# Patient Record
Sex: FEMALE | Race: WHITE | HISPANIC OR LATINO | URBAN - METROPOLITAN AREA
[De-identification: names, ages, dates, MRNs, and addresses within clinical notes are randomized per-mention and may not be internally consistent; named-entity substitution may affect disease eponyms.]

---

## 2023-08-06 ENCOUNTER — INPATIENT (INPATIENT)
Facility: HOSPITAL | Age: 63
LOS: 4 days | Discharge: HOME | DRG: 446 | End: 2023-08-11
Attending: HOSPITALIST | Admitting: HOSPITALIST
Payer: MEDICAID

## 2023-08-06 VITALS
HEART RATE: 74 BPM | RESPIRATION RATE: 20 BRPM | TEMPERATURE: 98 F | OXYGEN SATURATION: 98 % | SYSTOLIC BLOOD PRESSURE: 141 MMHG | DIASTOLIC BLOOD PRESSURE: 79 MMHG

## 2023-08-06 DIAGNOSIS — R10.9 UNSPECIFIED ABDOMINAL PAIN: ICD-10-CM

## 2023-08-06 LAB
ALBUMIN SERPL ELPH-MCNC: 3.5 G/DL — SIGNIFICANT CHANGE UP (ref 3.3–5.2)
ALP SERPL-CCNC: 89 U/L — SIGNIFICANT CHANGE UP (ref 40–120)
ALT FLD-CCNC: 27 U/L — SIGNIFICANT CHANGE UP
ANION GAP SERPL CALC-SCNC: 12 MMOL/L — SIGNIFICANT CHANGE UP (ref 5–17)
APTT BLD: 25.4 SEC — SIGNIFICANT CHANGE UP (ref 24.5–35.6)
AST SERPL-CCNC: 25 U/L — SIGNIFICANT CHANGE UP
BASOPHILS # BLD AUTO: 0.02 K/UL — SIGNIFICANT CHANGE UP (ref 0–0.2)
BASOPHILS NFR BLD AUTO: 0.2 % — SIGNIFICANT CHANGE UP (ref 0–2)
BILIRUB SERPL-MCNC: 0.6 MG/DL — SIGNIFICANT CHANGE UP (ref 0.4–2)
BLD GP AB SCN SERPL QL: SIGNIFICANT CHANGE UP
BUN SERPL-MCNC: 11.9 MG/DL — SIGNIFICANT CHANGE UP (ref 8–20)
CALCIUM SERPL-MCNC: 8.3 MG/DL — LOW (ref 8.4–10.5)
CHLORIDE SERPL-SCNC: 104 MMOL/L — SIGNIFICANT CHANGE UP (ref 96–108)
CO2 SERPL-SCNC: 20 MMOL/L — LOW (ref 22–29)
CREAT SERPL-MCNC: 0.56 MG/DL — SIGNIFICANT CHANGE UP (ref 0.5–1.3)
EGFR: 104 ML/MIN/1.73M2 — SIGNIFICANT CHANGE UP
EOSINOPHIL # BLD AUTO: 0.06 K/UL — SIGNIFICANT CHANGE UP (ref 0–0.5)
EOSINOPHIL NFR BLD AUTO: 0.7 % — SIGNIFICANT CHANGE UP (ref 0–6)
GLUCOSE BLDC GLUCOMTR-MCNC: 268 MG/DL — HIGH (ref 70–99)
GLUCOSE SERPL-MCNC: 314 MG/DL — HIGH (ref 70–99)
HCT VFR BLD CALC: 33.7 % — LOW (ref 34.5–45)
HGB BLD-MCNC: 11.3 G/DL — LOW (ref 11.5–15.5)
IMM GRANULOCYTES NFR BLD AUTO: 0.4 % — SIGNIFICANT CHANGE UP (ref 0–0.9)
INR BLD: 1.11 RATIO — SIGNIFICANT CHANGE UP (ref 0.85–1.18)
LYMPHOCYTES # BLD AUTO: 1.47 K/UL — SIGNIFICANT CHANGE UP (ref 1–3.3)
LYMPHOCYTES # BLD AUTO: 17.3 % — SIGNIFICANT CHANGE UP (ref 13–44)
MCHC RBC-ENTMCNC: 28.3 PG — SIGNIFICANT CHANGE UP (ref 27–34)
MCHC RBC-ENTMCNC: 33.5 GM/DL — SIGNIFICANT CHANGE UP (ref 32–36)
MCV RBC AUTO: 84.5 FL — SIGNIFICANT CHANGE UP (ref 80–100)
MONOCYTES # BLD AUTO: 0.54 K/UL — SIGNIFICANT CHANGE UP (ref 0–0.9)
MONOCYTES NFR BLD AUTO: 6.4 % — SIGNIFICANT CHANGE UP (ref 2–14)
NEUTROPHILS # BLD AUTO: 6.36 K/UL — SIGNIFICANT CHANGE UP (ref 1.8–7.4)
NEUTROPHILS NFR BLD AUTO: 75 % — SIGNIFICANT CHANGE UP (ref 43–77)
PLATELET # BLD AUTO: 225 K/UL — SIGNIFICANT CHANGE UP (ref 150–400)
POTASSIUM SERPL-MCNC: 4.4 MMOL/L — SIGNIFICANT CHANGE UP (ref 3.5–5.3)
POTASSIUM SERPL-SCNC: 4.4 MMOL/L — SIGNIFICANT CHANGE UP (ref 3.5–5.3)
PROT SERPL-MCNC: 7 G/DL — SIGNIFICANT CHANGE UP (ref 6.6–8.7)
PROTHROM AB SERPL-ACNC: 12.3 SEC — SIGNIFICANT CHANGE UP (ref 9.5–13)
RBC # BLD: 3.99 M/UL — SIGNIFICANT CHANGE UP (ref 3.8–5.2)
RBC # FLD: 12.9 % — SIGNIFICANT CHANGE UP (ref 10.3–14.5)
SODIUM SERPL-SCNC: 136 MMOL/L — SIGNIFICANT CHANGE UP (ref 135–145)
WBC # BLD: 8.48 K/UL — SIGNIFICANT CHANGE UP (ref 3.8–10.5)
WBC # FLD AUTO: 8.48 K/UL — SIGNIFICANT CHANGE UP (ref 3.8–10.5)

## 2023-08-06 PROCEDURE — 71045 X-RAY EXAM CHEST 1 VIEW: CPT | Mod: 26

## 2023-08-06 PROCEDURE — 99222 1ST HOSP IP/OBS MODERATE 55: CPT

## 2023-08-06 PROCEDURE — 99285 EMERGENCY DEPT VISIT HI MDM: CPT

## 2023-08-06 RX ORDER — LANOLIN ALCOHOL/MO/W.PET/CERES
3 CREAM (GRAM) TOPICAL AT BEDTIME
Refills: 0 | Status: DISCONTINUED | OUTPATIENT
Start: 2023-08-06 | End: 2023-08-11

## 2023-08-06 RX ORDER — INSULIN GLARGINE 100 [IU]/ML
5 INJECTION, SOLUTION SUBCUTANEOUS AT BEDTIME
Refills: 0 | Status: DISCONTINUED | OUTPATIENT
Start: 2023-08-06 | End: 2023-08-11

## 2023-08-06 RX ORDER — DEXTROSE 50 % IN WATER 50 %
12.5 SYRINGE (ML) INTRAVENOUS ONCE
Refills: 0 | Status: DISCONTINUED | OUTPATIENT
Start: 2023-08-06 | End: 2023-08-11

## 2023-08-06 RX ORDER — DEXTROSE 50 % IN WATER 50 %
25 SYRINGE (ML) INTRAVENOUS ONCE
Refills: 0 | Status: DISCONTINUED | OUTPATIENT
Start: 2023-08-06 | End: 2023-08-11

## 2023-08-06 RX ORDER — ONDANSETRON 8 MG/1
4 TABLET, FILM COATED ORAL EVERY 8 HOURS
Refills: 0 | Status: DISCONTINUED | OUTPATIENT
Start: 2023-08-06 | End: 2023-08-11

## 2023-08-06 RX ORDER — MORPHINE SULFATE 50 MG/1
2 CAPSULE, EXTENDED RELEASE ORAL EVERY 4 HOURS
Refills: 0 | Status: DISCONTINUED | OUTPATIENT
Start: 2023-08-06 | End: 2023-08-11

## 2023-08-06 RX ORDER — SODIUM CHLORIDE 9 MG/ML
1000 INJECTION INTRAMUSCULAR; INTRAVENOUS; SUBCUTANEOUS
Refills: 0 | Status: DISCONTINUED | OUTPATIENT
Start: 2023-08-06 | End: 2023-08-08

## 2023-08-06 RX ORDER — GLUCAGON INJECTION, SOLUTION 0.5 MG/.1ML
1 INJECTION, SOLUTION SUBCUTANEOUS ONCE
Refills: 0 | Status: DISCONTINUED | OUTPATIENT
Start: 2023-08-06 | End: 2023-08-11

## 2023-08-06 RX ORDER — SODIUM CHLORIDE 9 MG/ML
1000 INJECTION, SOLUTION INTRAVENOUS
Refills: 0 | Status: DISCONTINUED | OUTPATIENT
Start: 2023-08-06 | End: 2023-08-11

## 2023-08-06 RX ORDER — INSULIN LISPRO 100/ML
VIAL (ML) SUBCUTANEOUS EVERY 6 HOURS
Refills: 0 | Status: DISCONTINUED | OUTPATIENT
Start: 2023-08-06 | End: 2023-08-11

## 2023-08-06 RX ORDER — DEXTROSE 50 % IN WATER 50 %
15 SYRINGE (ML) INTRAVENOUS ONCE
Refills: 0 | Status: DISCONTINUED | OUTPATIENT
Start: 2023-08-06 | End: 2023-08-11

## 2023-08-06 RX ORDER — ACETAMINOPHEN 500 MG
650 TABLET ORAL EVERY 6 HOURS
Refills: 0 | Status: DISCONTINUED | OUTPATIENT
Start: 2023-08-06 | End: 2023-08-11

## 2023-08-06 RX ORDER — SODIUM CHLORIDE 9 MG/ML
3 INJECTION INTRAMUSCULAR; INTRAVENOUS; SUBCUTANEOUS EVERY 8 HOURS
Refills: 0 | Status: DISCONTINUED | OUTPATIENT
Start: 2023-08-06 | End: 2023-08-11

## 2023-08-06 RX ADMIN — SODIUM CHLORIDE 3 MILLILITER(S): 9 INJECTION INTRAMUSCULAR; INTRAVENOUS; SUBCUTANEOUS at 22:52

## 2023-08-06 RX ADMIN — SODIUM CHLORIDE 100 MILLILITER(S): 9 INJECTION INTRAMUSCULAR; INTRAVENOUS; SUBCUTANEOUS at 23:24

## 2023-08-06 RX ADMIN — INSULIN GLARGINE 5 UNIT(S): 100 INJECTION, SOLUTION SUBCUTANEOUS at 23:23

## 2023-08-06 NOTE — ED ADULT NURSE NOTE - NSFALLUNIVINTERV_ED_ALL_ED
Bed/Stretcher in lowest position, wheels locked, appropriate side rails in place/Call bell, personal items and telephone in reach/Instruct patient to call for assistance before getting out of bed/chair/stretcher/Non-slip footwear applied when patient is off stretcher/[place] to call system/Physically safe environment - no spills, clutter or unnecessary equipment/Purposeful proactive rounding/Room/bathroom lighting operational, light cord in reach Bed/Stretcher in lowest position, wheels locked, appropriate side rails in place/Call bell, personal items and telephone in reach/Instruct patient to call for assistance before getting out of bed/chair/stretcher/Non-slip footwear applied when patient is off stretcher/[place] to call system/Physically safe environment - no spills, clutter or unnecessary equipment/Purposeful proactive rounding/Room/bathroom lighting operational, light cord in reach Bed/Stretcher in lowest position, wheels locked, appropriate side rails in place/Call bell, personal items and telephone in reach/Instruct patient to call for assistance before getting out of bed/chair/stretcher/Non-slip footwear applied when patient is off stretcher/[place] to call system/Physically safe environment - no spills, clutter or unnecessary equipment/Purposeful proactive rounding/Room/bathroom lighting operational, light cord in reach

## 2023-08-06 NOTE — ED PROVIDER NOTE - ATTENDING CONTRIBUTION TO CARE
64 yo F presents to ED transferred from [facility] [place] ERCP after having MRCP at [facility].  Pt c/o mild abd pain at present with no reported fever or vomiting.  On exam afebrile, non-toxic angelia, well hydrated, non-toxic appearing, PERRL, throat clear mm moist, Neck supple, Cor Reg, Lungs clear b/l, Abd soft, + mild RUQ tenderness to palp, no R/R/G,  Ext FROM, Neuro non-focal.  Will ken to [facility] for ERCP tomorrow by GI 64 yo F presents to ED transferred from [facility] [place] [place] from ERCP after having MRCP at [facility] [place] [place].  Pt c/o mild abd pain at present with no reported fever or vomiting.  On exam afebrile, non-toxic angelia, well hydrated, non-toxic appearing, PERRL, throat clear mm moist, Neck supple, Cor Reg, Lungs clear b/l, Abd soft, + mild RUQ tenderness to palp, no R/R/G,  Ext FROM, Neuro non-focal.  Will ken to [facility] for ERCP tomorrow by GI 62 yo F presents to ED transferred from [facility] from ERCP after having MRCP at [facility].  Pt c/o mild abd pain at present with no reported fever or vomiting.  On exam afebrile, non-toxic angelia, well hydrated, non-toxic appearing, PERRL, throat clear mm moist, Neck supple, Cor Reg, Lungs clear b/l, Abd soft, + mild RUQ tenderness to palp, no R/R/G,  Ext FROM, Neuro non-focal.  Will ken to [facility] for ERCP tomorrow by GI

## 2023-08-06 NOTE — ED ADULT NURSE NOTE - CHIEF COMPLAINT QUOTE
patient BIBA from [facility] [place] for [place]. states this AM woke up with abdominal pain, n/v/d. no fevers. patient BIBA from [facility] sent for ERCP. states this AM woke up with abdominal pain, n/v/d. no fevers. patient B[place] [facility] [place]. states this AM woke up with abdominal pain, n/v/d. no fevers.

## 2023-08-06 NOTE — ED ADULT NURSE NOTE - OBJECTIVE STATEMENT
pt presents from [facility] for ercp, patient received zosyn and fluid pta. patient resting comfortably, denies c/p/sob pt presents from [facility] for ercp, patient received zosyn and fluid pta. patient resting comfortably, denies c/p/sob pt presents from [facility] for ercp, patient received zosyn and fluid pta. patient resting comfortably, denies c/p/sob

## 2023-08-06 NOTE — ED PROVIDER NOTE - OBJECTIVE STATEMENT
64 y/o F pt sent from [facility] for ERCP. Has abd pain and NVD. No fever.     Presented to [facility] and had MRCP that showed: 1. Localized left hepatic lobe intraductal dilation. Difficult to localize T1 hyperintense nodule at the site of biliary obstruction, possibly representing an intraductal stone/proteinaceous material (such as in the setting of biliary stricture, recurrent pyogenic cholangitis, or Caroli's disease) versus portal venous thrombus or adjacent parenchymal lesion. 2. Nonspecific extrahepatic ductal dilation without specific cause identified. 64 y/o F pt sent from [facility] [place] [facility] [place] for [place]CP. Has abd pain and NVD. No fever.     Presented to [facility] [place] [facility] [place] and had MRCP that showed: 1. Localized left hepatic lobe intraductal dilation. Difficult to localize T1 hyperintense nodule at the site of biliary obstruction, possibly representing an intraductal stone/proteinaceous material (such as in the setting of biliary stricture, recurrent pyogenic cholangitis, or Caroli's disease) versus portal venous thrombus or adjacent parenchymal lesion. 2. Nonspecific extrahepatic ductal dilation without specific cause identified. 62 y/o F pt sent from [facility] for ERCP. Has abd pain and NVD. No fever.     Presented to [facility] and had MRCP that showed: 1. Localized left hepatic lobe intraductal dilation. Difficult to localize T1 hyperintense nodule at the site of biliary obstruction, possibly representing an intraductal stone/proteinaceous material (such as in the setting of biliary stricture, recurrent pyogenic cholangitis, or Caroli's disease) versus portal venous thrombus or adjacent parenchymal lesion. 2. Nonspecific extrahepatic ductal dilation without specific cause identified.

## 2023-08-06 NOTE — ED PROVIDER NOTE - CLINICAL SUMMARY MEDICAL DECISION MAKING FREE TEXT BOX
Pt sent from [facility] [place]. Preop labs and admission to medicine. NPO after midnight. Supportive care and close monitoring. [place] [facility] [place] Preop labs and admission to medicine. NPO after midnight. Supportive care and close monitoring. Pt sent from [facility] [place]. Preop labs and admission to medicine. NPO after midnight. Supportive care and close monitoring.

## 2023-08-06 NOTE — ED ADULT TRIAGE NOTE - CHIEF COMPLAINT QUOTE
patient BIBA from [facility] [place] for [place]CP. states this AM woke up with abdominal pain, n/v/d. no fevers. patient BIBA from [facility] sent for ERCP. states this AM woke up with abdominal pain, n/v/d. no fevers. patient BIBA from [facility] sent for ERCP. states this AM woke up with abdominal pain, n/v/d. no fevers.

## 2023-08-06 NOTE — ED PROVIDER NOTE - PHYSICAL EXAMINATION
Gen: Well appearing in NAD  Head: NC/AT  Neck: trachea midline  Card: regular rate and rhythm  Resp:  CTAB  Abd: soft, +epigastric ttp  Ext: no deformities above reported baseline  Neuro:  A&O, no motor or sensory deficits above reported baseline  Skin:  Warm and dry as visualized  Psych:  Normal affect and mood

## 2023-08-07 DIAGNOSIS — R93.89 ABNORMAL FINDINGS ON DIAGNOSTIC IMAGING OF OTHER SPECIFIED BODY STRUCTURES: ICD-10-CM

## 2023-08-07 LAB
A1C WITH ESTIMATED AVERAGE GLUCOSE RESULT: 10.2 % — HIGH (ref 4–5.6)
ALBUMIN SERPL ELPH-MCNC: 3.6 G/DL — SIGNIFICANT CHANGE UP (ref 3.3–5.2)
ALP SERPL-CCNC: 80 U/L — SIGNIFICANT CHANGE UP (ref 40–120)
ALT FLD-CCNC: 26 U/L — SIGNIFICANT CHANGE UP
ANION GAP SERPL CALC-SCNC: 13 MMOL/L — SIGNIFICANT CHANGE UP (ref 5–17)
AST SERPL-CCNC: 18 U/L — SIGNIFICANT CHANGE UP
BILIRUB SERPL-MCNC: 0.5 MG/DL — SIGNIFICANT CHANGE UP (ref 0.4–2)
BUN SERPL-MCNC: 8.9 MG/DL — SIGNIFICANT CHANGE UP (ref 8–20)
CALCIUM SERPL-MCNC: 8.5 MG/DL — SIGNIFICANT CHANGE UP (ref 8.4–10.5)
CHLORIDE SERPL-SCNC: 108 MMOL/L — SIGNIFICANT CHANGE UP (ref 96–108)
CO2 SERPL-SCNC: 19 MMOL/L — LOW (ref 22–29)
CREAT SERPL-MCNC: 0.51 MG/DL — SIGNIFICANT CHANGE UP (ref 0.5–1.3)
EGFR: 106 ML/MIN/1.73M2 — SIGNIFICANT CHANGE UP
ESTIMATED AVERAGE GLUCOSE: 246 MG/DL — HIGH (ref 68–114)
GLUCOSE BLDC GLUCOMTR-MCNC: 191 MG/DL — HIGH (ref 70–99)
GLUCOSE BLDC GLUCOMTR-MCNC: 214 MG/DL — HIGH (ref 70–99)
GLUCOSE BLDC GLUCOMTR-MCNC: 223 MG/DL — HIGH (ref 70–99)
GLUCOSE BLDC GLUCOMTR-MCNC: 238 MG/DL — HIGH (ref 70–99)
GLUCOSE BLDC GLUCOMTR-MCNC: 253 MG/DL — HIGH (ref 70–99)
GLUCOSE BLDC GLUCOMTR-MCNC: 269 MG/DL — HIGH (ref 70–99)
GLUCOSE SERPL-MCNC: 248 MG/DL — HIGH (ref 70–99)
HCT VFR BLD CALC: 36.9 % — SIGNIFICANT CHANGE UP (ref 34.5–45)
HGB BLD-MCNC: 12 G/DL — SIGNIFICANT CHANGE UP (ref 11.5–15.5)
MAGNESIUM SERPL-MCNC: 1.8 MG/DL — SIGNIFICANT CHANGE UP (ref 1.8–2.6)
MCHC RBC-ENTMCNC: 28 PG — SIGNIFICANT CHANGE UP (ref 27–34)
MCHC RBC-ENTMCNC: 32.5 GM/DL — SIGNIFICANT CHANGE UP (ref 32–36)
MCV RBC AUTO: 86 FL — SIGNIFICANT CHANGE UP (ref 80–100)
PHOSPHATE SERPL-MCNC: 2.9 MG/DL — SIGNIFICANT CHANGE UP (ref 2.4–4.7)
PLATELET # BLD AUTO: 255 K/UL — SIGNIFICANT CHANGE UP (ref 150–400)
POTASSIUM SERPL-MCNC: 3.9 MMOL/L — SIGNIFICANT CHANGE UP (ref 3.5–5.3)
POTASSIUM SERPL-SCNC: 3.9 MMOL/L — SIGNIFICANT CHANGE UP (ref 3.5–5.3)
PROT SERPL-MCNC: 6.9 G/DL — SIGNIFICANT CHANGE UP (ref 6.6–8.7)
RBC # BLD: 4.29 M/UL — SIGNIFICANT CHANGE UP (ref 3.8–5.2)
RBC # FLD: 13.2 % — SIGNIFICANT CHANGE UP (ref 10.3–14.5)
SODIUM SERPL-SCNC: 140 MMOL/L — SIGNIFICANT CHANGE UP (ref 135–145)
WBC # BLD: 7.38 K/UL — SIGNIFICANT CHANGE UP (ref 3.8–10.5)
WBC # FLD AUTO: 7.38 K/UL — SIGNIFICANT CHANGE UP (ref 3.8–10.5)

## 2023-08-07 PROCEDURE — 99233 SBSQ HOSP IP/OBS HIGH 50: CPT

## 2023-08-07 PROCEDURE — 99222 1ST HOSP IP/OBS MODERATE 55: CPT

## 2023-08-07 PROCEDURE — 93010 ELECTROCARDIOGRAM REPORT: CPT

## 2023-08-07 RX ORDER — FAMOTIDINE 10 MG/ML
20 INJECTION INTRAVENOUS DAILY
Refills: 0 | Status: DISCONTINUED | OUTPATIENT
Start: 2023-08-07 | End: 2023-08-11

## 2023-08-07 RX ADMIN — Medication 4: at 06:27

## 2023-08-07 RX ADMIN — Medication 4: at 12:45

## 2023-08-07 RX ADMIN — Medication 2: at 19:18

## 2023-08-07 RX ADMIN — SODIUM CHLORIDE 100 MILLILITER(S): 9 INJECTION INTRAMUSCULAR; INTRAVENOUS; SUBCUTANEOUS at 12:48

## 2023-08-07 RX ADMIN — FAMOTIDINE 100 MILLIGRAM(S): 10 INJECTION INTRAVENOUS at 12:26

## 2023-08-07 RX ADMIN — INSULIN GLARGINE 5 UNIT(S): 100 INJECTION, SOLUTION SUBCUTANEOUS at 21:41

## 2023-08-07 RX ADMIN — Medication 650 MILLIGRAM(S): at 23:57

## 2023-08-07 RX ADMIN — SODIUM CHLORIDE 100 MILLILITER(S): 9 INJECTION INTRAMUSCULAR; INTRAVENOUS; SUBCUTANEOUS at 21:41

## 2023-08-07 RX ADMIN — Medication 6: at 00:55

## 2023-08-07 RX ADMIN — Medication 4: at 23:57

## 2023-08-07 NOTE — CONSULT NOTE ADULT - ASSESSMENT
64 y/o female with hx of DM-2, HTN, HLD, GERD presented to ED after transferred from [facility] after radiology finding of localized left hepatic intraductal dilation on MRCP. RUQ sono with mild CBD dilation, measuring 8 mm, MRCP w/ localized left hepatic intraductal dilation concerning for neoplasm, stone, or stricture. Normal Liver enzymes.      64 y/o female with hx of DM-2, HTN, HLD, GERD presented to ED after transferred [place] [facility] [place] after radiology finding of localized left hepatic intraductal dilation on MRCP. RUQ sono with mild CBD dilation, measuring 8 mm, MRCP w/ localized left hepatic intraductal dilation concerning for neoplasm, stone, or stricture. Normal Liver enzymes.      64 y/o female with hx of DM-2, HTN, HLD, GERD presented to ED after transferred from [facility] [place] after radiology finding of localized left hepatic intraductal dilation on MRCP. RUQ sono with mild CBD dilation, measuring 8 mm, MRCP w/ localized left hepatic intraductal dilation concerning for neoplasm, stone, or stricture. Normal Liver enzymes.

## 2023-08-07 NOTE — CONSULT NOTE ADULT - NS ATTEND AMEND GEN_ALL_CORE FT
Patient seen and examined.  Patient was transferred from [facility].  She had some abdominal pain and also nausea vomiting and diarrhea.  All of them have abated.  There was mild elevation in the LFTs.  Ultrasound abdominal revealed mild dilation of the bile duct.  MRCP revealed intrahepatic ductal dilation.  Gallbladder was normal.  LFTs are completely normal.  White cell count is normal.  At this time, there is no particular indication to perform EUS ERCP.  However, we will order MRI with MRCP with IV contrast to further evaluate.  Surgical oncology evaluation should be considered.  Advance diet.  If the patient has insurance this procedure can also be performed as outpatient if patient tolerates diet and has no further symptoms. Patient seen and examined.  Patient was transferred from [facility].  She had some abdominal pain and also nausea vomiting and diarrhea.  All of them have abated.  There was mild elevation in the LFTs.  Ultrasound abdominal revealed mild dilation of the bile duct.  MRCP revealed intrahepatic ductal dilation.  Gallbladder was normal.  LFTs are completely normal.  White cell count is normal.  At this time, there is no particular indication to perform EUS ERCP.  However, we will order MRI with MRCP with IV contrast to further evaluate.  Surgical oncology evaluation should be considered.  Advance diet.  If the patient has insurance this procedure can also be performed as outpatient if patient tolerates diet and has no further symptoms. Patient seen and examined.  Patient was transferred from [facility].  She had some abdominal pain and also nausea vomiting and diarrhea.  All of them have abated.  There was mild elevation in the LFTs.  Ultrasound abdominal revealed mild dilation of the bile duct.  MRCP revealed intrahepatic ductal dilation.  Gallbladder was normal.  LFTs are completely normal.  White cell count is normal.  At this time, there is no particular indication to perform EUS ERCP.  However, we will order MRI with MRCP with IV contrast to further evaluate.  Surgical oncology evaluation should be considered.  Advance diet.  If the patient has insurance this procedure can also be performed as outpatient if patient tolerates diet and has no further symptoms.

## 2023-08-07 NOTE — CONSULT NOTE ADULT - PROBLEM SELECTOR RECOMMENDATION 9
RUQ sono with mild CBD dilation, measuring 8 mm, MRCP w/ localized left hepatic intraductal dilation concerning for neoplasm, stone, or stricture.   Her liver enzymes are normal, abdominal pain vomiting, diarrhea now resolved.   Can resume diet, consistent carb diet. Elevated blood glucose  400 on admission ([facility]), AIC 10.2. Symptoms may be due to gastroparesis.   Protonix for GI mucosal protection   Will discuss MRCP report with the radiologist. May need to repeat MRCP for further evaluation.   Trend LFTs, avoid hepatotoxic agents.

## 2023-08-07 NOTE — H&P ADULT - HISTORY OF PRESENT ILLNESS
62 y/o female with hx of DM-2, HTN, HLD, GERD who went to [facility] earlier today c/o RUQ pain, Nausea, followed by NBNB vomits. Describes symptoms as sudden onset with no recent illnesses, travel, or sick contacts. She denies any changes in her medications. At [facility] noted to have stable vitals, RUQ pain on exam, elevated AP with mild elevation of ALT, normal AST and normal bili. RUQ sono with mild CBD dilation, MRCP w/ localized left hepatic intraductal dilation concerning for neoplasm, stone, or stricture. Case was discussed with GI on call who agreed to have Patient transferred here for ERCP. At this time denies N/V, pain, SOB, F/C. Vitals stable. Seen with .  64 y/o female with hx of DM-2, HTN, HLD, GERD who went to [facility] earlier today c/o RUQ pain, Nausea, followed by NBNB vomits. Describes symptoms as sudden onset with no recent illnesses, travel, or sick contacts. She denies any changes in her medications. At [facility] noted to have stable vitals, RUQ pain on exam, elevated AP with mild elevation of ALT, normal AST and normal bili. RUQ sono with mild CBD dilation, MRCP w/ localized left hepatic intraductal dilation concerning for neoplasm, stone, or stricture. Case was discussed with GI on call who agreed to have Patient transferred here for ERCP. At this time denies N/V, pain, SOB, F/C. Vitals stable. Seen with .  64 y/o female with hx of DM-2, HTN, HLD, GERD who went to [facility] earlier today c/o RUQ pain, Nausea, followed by NBNB vomits. Describes symptoms as sudden onset with no recent illnesses, travel, or sick contacts. She denies any changes in her medications. At [facility] noted to have stable vitals, RUQ pain on exam, elevated AP with mild elevation of ALT, normal AST and normal bili. RUQ sono with mild CBD dilation, MRCP w/ localized left hepatic intraductal dilation concerning for neoplasm, stone, or stricture. Case was discussed with GI on call who agreed to have Patient transferred here for ERCP. At this time denies N/V, pain, SOB, F/C. Vitals stable. Seen with .

## 2023-08-07 NOTE — CHART NOTE - NSCHARTNOTEFT_GEN_A_CORE
chart H and P reviewed   pt is seen with Sulaiman olvera shailesh   GI consult noted   Pt is with some abd discomfort no nausea   NPO   vitals reviewed   further testing ERCP per GI final rec   will monitor and follow up

## 2023-08-07 NOTE — H&P ADULT - ASSESSMENT
64 y/o female with left hepatic lobe intraductal dilation, hx of DM-2, HTN, HLD, GERD    Left hepatic lobe intra-ductal dilation:  -Imaging/labs from [facility] reviewed in [facility]  -NPO after MN for possible ERCP in AM  -IVF  -Anti-emetics, analgesics  -Repeat CMP in AM  -Lipase normal at [facility]  -GI aware  -VC boots, OOB, ambulate    DM-2:  -Hold oral agents  -Q 6 hour accu checks  -SS coverage, basal insulin    HTN:  -Resume BP regimen post procedure     HLD:  -Resume Statin on DC    GERD:  -IV H2 while NPO    Case discussed with Patient using , Nursing  62 y/o female with left hepatic lobe intraductal dilation, hx of DM-2, HTN, HLD, GERD    Left hepatic lobe intra-ductal dilation:  -Imaging/labs from [facility] reviewed in [facility]  -NPO after MN for possible ERCP in AM  -IVF  -Anti-emetics, analgesics  -Repeat CMP in AM  -Lipase normal at [facility]  -GI aware  -VC boots, OOB, ambulate    DM-2:  -Hold oral agents  -Q 6 hour accu checks  -SS coverage, basal insulin    HTN:  -Resume BP regimen post procedure     HLD:  -Resume Statin on DC    GERD:  -IV H2 while NPO    Case discussed with Patient using , Nursing

## 2023-08-07 NOTE — PATIENT PROFILE ADULT - FALL HARM RISK - UNIVERSAL INTERVENTIONS
Bed in lowest position, wheels locked, appropriate side rails in place/Call bell, personal items and telephone in reach/Instruct patient to call for assistance before getting out of bed or chair/Non-slip footwear when patient is out of bed/[place] to call system/Physically safe environment - no spills, clutter or unnecessary equipment/Purposeful Proactive Rounding/Room/bathroom lighting operational, light cord in reach Bed in lowest position, wheels locked, appropriate side rails in place/Call bell, personal items and telephone in reach/Instruct patient to call for assistance before getting out of bed or chair/Non-slip footwear when patient is out of bed/[place] to call system/Physically safe environment - no spills, clutter or unnecessary equipment/Purposeful Proactive Rounding/Room/bathroom lighting operational, light cord in reach Bed in lowest position, wheels locked, appropriate side rails in place/Call bell, personal items and telephone in reach/Instruct patient to call for assistance before getting out of bed or chair/Non-slip footwear when patient is out of bed/[place] to call system/Physically safe environment - no spills, clutter or unnecessary equipment/Purposeful Proactive Rounding/Room/bathroom lighting operational, light cord in reach

## 2023-08-07 NOTE — CONSULT NOTE ADULT - SUBJECTIVE AND OBJECTIVE BOX
HISTORY OF PRESENT ILLNESS: 62 y/o female with hx of DM-2, HTN, HLD, GERD presented to ED after transferred from [facility] [place] after radiology finding of localized left hepatic intraductal dilation on MRCP. Patient was originally presented to [facility] [place] with c/o RUQ pain, Nausea,  NBNB emesis, and diarrhea x1 say. Reported as sudden onset and denies sick contact. Reports similar symptoms 1 year ago and for which she was seen at the hospital. No prior surgeries. No new medications. Patient is uncertain about the weight loss. She use of alcohol, smoking or illicit drug use. She denies family history of cancer. On arrival to [facility] [place] ED her labs shows elevated blood glucose 399, alk ph 140, ALT 41, normal AST, normal TB. Mild leucocytosis 15 K which is now normalized. Patient is afebrile.  RUQ sono with mild CBD dilation, MRCP w/ localized left hepatic intraductal dilation concerning for neoplasm, stone, or stricture. Her LFTs are now normalized. Patient reports some epigastric and RUQ pain. No tenderness. Nausea, vomiting and diarrhea now resolved. Patient requesting to eat breakfast.    ID [id number] was used.       Review of Systems:  . Constitutional: No fever, chills  . HEENT: Negative  · Respiratory and Thorax: No shortness of breath, no cough  · Cardiovascular: No chest pain, palpitation, no dizziness  · Gastrointestinal: see above  · Genitourinary: No hematuria  · Musculoskeletal: Negative  · Neurological: negative  · Psychiatric: no agitation, no anxiety      PAST MEDICAL/SURGICAL HISTORY:  HTN (hypertension)    HLD (hyperlipidemia)    Diabetes    GERD (gastroesophageal reflux disease)    No significant past surgical history      SOCIAL HISTORY:  - TOBACCO: Denies  - ALCOHOL: Denies  - ILLICIT DRUG USE: Denies    FAMILY HISTORY:  No known history of gastrointestinal or liver disease;  No pertinent family history in first degree relatives        HOME MEDICATIONS:  glimepiride 4 mg oral tablet: 1 orally once a day (07 Aug 2023 01:25)  Janumet 50 mg-500 mg oral tablet: 1 orally once a day (07 Aug 2023 01:25)  Lipitor 20 mg oral tablet: 1 orally once a day (07 Aug 2023 01:25)  lisinopril 20 mg oral tablet: 1 orally once a day (07 Aug 2023 01:25)  Protonix 40 mg oral delayed release tablet: 1 orally once a day (07 Aug 2023 01:25)    INPATIENT MEDICATIONS:  MEDICATIONS  (STANDING):  dextrose 5%. 1000 milliLiter(s) (50 mL/Hr) IV Continuous <Continuous>  dextrose 5%. 1000 milliLiter(s) (100 mL/Hr) IV Continuous <Continuous>  dextrose 50% Injectable 25 Gram(s) IV Push once  dextrose 50% Injectable 12.5 Gram(s) IV Push once  dextrose 50% Injectable 25 Gram(s) IV Push once  famotidine  IVPB 20 milliGRAM(s) IV Intermittent daily  glucagon  Injectable 1 milliGRAM(s) IntraMuscular once  insulin glargine Injectable (LANTUS) 5 Unit(s) SubCutaneous at bedtime  insulin lispro (ADMELOG) corrective regimen sliding scale   SubCutaneous every 6 hours  sodium chloride 0.9% lock flush 3 milliLiter(s) IV Push every 8 hours  sodium chloride 0.9%. 1000 milliLiter(s) (100 mL/Hr) IV Continuous <Continuous>    MEDICATIONS  (PRN):  acetaminophen     Tablet .. 650 milliGRAM(s) Oral every 6 hours PRN Temp greater or equal to 38C (100.4F), Mild Pain (1 - 3)  aluminum hydroxide/magnesium hydroxide/simethicone Suspension 30 milliLiter(s) Oral every 4 hours PRN Dyspepsia  dextrose Oral Gel 15 Gram(s) Oral once PRN Blood Glucose LESS THAN 70 milliGRAM(s)/deciliter  melatonin 3 milliGRAM(s) Oral at bedtime PRN Insomnia  morphine  - Injectable 2 milliGRAM(s) IV Push every 4 hours PRN Moderate Pain (4 - 6)  ondansetron Injectable 4 milliGRAM(s) IV Push every 8 hours PRN Nausea and/or Vomiting    ALLERGIES:  No Known Allergies    T(C): 36.9 (08-07-23 @ 04:41), Max: 36.9 (08-07-23 @ 04:41)  HR: 75 (08-07-23 @ 04:41) (64 - 78)  BP: 123/75 (08-07-23 @ 04:41) (123/75 - 141/79)  RR: 18 (08-07-23 @ 04:41) (18 - 20)  SpO2: 99% (08-07-23 @ 04:41) (98% - 99%)      PHYSICAL EXAM:    Constitutional: No acute distress  Neuro: Awake alert, oriented  HEENT: anicteric sclerae  Neck: supple, no JVD  CV: regular rate, regular rhythm  Pulm/chest: lung sounds CTA and equal bilaterally, no accessory muscle use noted  Abd: soft, nontender, nondistended +bowel sounds. No rigidity, rebound tenderness, or guarding  Ext: no Cyanosis, clubbing or edema  Skin: warm, no jaundice   Psych: calm, cooperative      LABS:             12.0   7.38  )-----------( 255      ( 08-07 @ 06:34 )             36.9                11.3   8.48  )-----------( 225      ( 08-06 @ 20:56 )             33.7       PT/INR - ( 06 Aug 2023 20:56 )   PT: 12.3 sec;   INR: 1.11 ratio         PTT - ( 06 Aug 2023 20:56 )  PTT:25.4 sec  08-07    140  |  108  |  8.9  ----------------------------<  248<H>  3.9   |  19.0<L>  |  0.51    Ca    8.5      07 Aug 2023 06:34  Phos  2.9     08-07  Mg     1.8     08-07    TPro  6.9  /  Alb  3.6  /  TBili  0.5  /  DBili  x   /  AST  18  /  ALT  26  /  AlkPhos  80  08-07    LIVER FUNCTIONS - ( 07 Aug 2023 06:34 )  Alb: 3.6 g/dL / Pro: 6.9 g/dL / ALK PHOS: 80 U/L / ALT: 26 U/L / AST: 18 U/L / GGT: x                 Urinalysis Basic - ( 07 Aug 2023 06:34 )    Color: x / Appearance: x / SG: x / pH: x  Gluc: 248 mg/dL / Ketone: x  / Bili: x / Urobili: x   Blood: x / Protein: x / Nitrite: x   Leuk Esterase: x / RBC: x / WBC x   Sq Epi: x / Non Sq Epi: x / Bacteria: x            INTERPRETATION:  CLINICAL INFORMATION: Dilated CBD    COMPARISON: Abdominal ultrasound dated 08/06/2023    CONTRAST/COMPLICATIONS:  IV Contrast: NONE  Oral Contrast: NONE  Complications: None reported at time of study completion    PROCEDURE:  MRI/MRCP was performed. Radial and 3D MRCP sequences were obtained.      FINDINGS:  Limited study without intravenous contrast. Many of the sequences are also partially limited due to respiratory motion artifact.    LOWER CHEST: Within normal limits.    LIVER: Steatosis. Hepatomegaly with relative enlargement of the right hepatic lobe/Riedel's lobe.  BILE DUCTS: Localized mild to moderate intrahepatic ductal dilatation at the junctions of segments 2 and 4. Difficult to localize 14 mm T1 hyperintense nodule at the site of biliary obstruction, possibly an intraductal stone or proteinaceous material within a dilated ectatic duct versus parenchymal or vascular in location. Mild extrahepatic dilatation without definite cause identified, upper common bile duct measuring 8 mm. Tapered appearance of the distal duct above the ampulla.  GALLBLADDER: Within normal limits.  SPLEEN: Within normal limits.  PANCREAS: Within normal limits.  ADRENALS: Within normal limits.  KIDNEYS/URETERS: Within normal limits.    VISUALIZED PORTIONS:  BOWEL: Within normal limits.  PERITONEUM: No ascites.  VESSELS: Within normal limits.  RETROPERITONEUM/LYMPH NODES: No lymphadenopathy.  ABDOMINAL WALL: To fat-containing umbilical hernia.  BONES: Within normal limits.    IMPRESSION:  1.  Localized left hepatic lobe intraductal dilatation. Difficult to localize T1 hyperintense nodule at the site of biliary obstruction, possibly representing an intraductal stone/proteinaceous material (such as in the setting of biliary stricture, recurrent pyogenic cholangitis, or Caroli's disease) versus portal venous thrombus or adjacent parenchymal lesion.  2.  Nonspecific extrahepatic ductal dilatation without specific cause identified. Consider ampullary stenosis, occult ampullary lesion, or stricture.  3.  Hepatic steatosis and hepatomegaly.    Recommend repeat MRI/MRCP with and without contrast.    US abdomen (08/06/2023):     COMPARISON: None available.    TECHNIQUE: Sonography of the right upper quadrant.    FINDINGS:  Liver: Diffuse fatty infiltration.  Bile ducts: Mild intrahepatic biliary ductal dilatation. Common bile duct measures 8 mm.  Gallbladder: Within normal limits.  Pancreas: Visualized portions are within normal limits.  Right kidney: 10.4 cm. No hydronephrosis.  Ascites: None.  IVC: Visualized portions are within normal limits.    IMPRESSION:  Hepatic steatosis.    Mild intrahepatic biliary ductal dilatation and mild common bile duct dilatation without identifiable cause. Further evaluation with contrast-enhanced CT scan of the abdomen recommended. HISTORY OF PRESENT ILLNESS: 64 y/o female with hx of DM-2, HTN, HLD, GERD presented to ED after transferred from [facility] [place] after radiology finding of localized left hepatic intraductal dilation on MRCP. Patient was originally presented to [facility] with c/o RUQ pain, Nausea,  NBNB emesis, and diarrhea x1 say. Reported as sudden onset and denies sick contact. Reports similar symptoms 1 year ago and for which she was seen at the hospital. No prior surgeries. No new medications. Patient is uncertain about the weight loss. She use of alcohol, smoking or illicit drug use. She denies family history of cancer. On arrival to [facility] ED her labs shows elevated blood glucose 399, alk ph 140, ALT 41, normal AST, normal TB. Mild leucocytosis 15 K which is now normalized. Patient is afebrile.  RUQ sono with mild CBD dilation, MRCP w/ localized left hepatic intraductal dilation concerning for neoplasm, stone, or stricture. Her LFTs are now normalized. Patient reports some epigastric and RUQ pain. No tenderness. Nausea, vomiting and diarrhea now resolved. Patient requesting to eat breakfast.    ID [id number] was used.       Review of Systems:  . Constitutional: No fever, chills  . HEENT: Negative  · Respiratory and Thorax: No shortness of breath, no cough  · Cardiovascular: No chest pain, palpitation, no dizziness  · Gastrointestinal: see above  · Genitourinary: No hematuria  · Musculoskeletal: Negative  · Neurological: negative  · Psychiatric: no agitation, no anxiety      PAST MEDICAL/SURGICAL HISTORY:  HTN (hypertension)    HLD (hyperlipidemia)    Diabetes    GERD (gastroesophageal reflux disease)    No significant past surgical history      SOCIAL HISTORY:  - TOBACCO: Denies  - ALCOHOL: Denies  - ILLICIT DRUG USE: Denies    FAMILY HISTORY:  No known history of gastrointestinal or liver disease;  No pertinent family history in first degree relatives        HOME MEDICATIONS:  glimepiride 4 mg oral tablet: 1 orally once a day (07 Aug 2023 01:25)  Janumet 50 mg-500 mg oral tablet: 1 orally once a day (07 Aug 2023 01:25)  Lipitor 20 mg oral tablet: 1 orally once a day (07 Aug 2023 01:25)  lisinopril 20 mg oral tablet: 1 orally once a day (07 Aug 2023 01:25)  Protonix 40 mg oral delayed release tablet: 1 orally once a day (07 Aug 2023 01:25)    INPATIENT MEDICATIONS:  MEDICATIONS  (STANDING):  dextrose 5%. 1000 milliLiter(s) (50 mL/Hr) IV Continuous <Continuous>  dextrose 5%. 1000 milliLiter(s) (100 mL/Hr) IV Continuous <Continuous>  dextrose 50% Injectable 25 Gram(s) IV Push once  dextrose 50% Injectable 12.5 Gram(s) IV Push once  dextrose 50% Injectable 25 Gram(s) IV Push once  famotidine  IVPB 20 milliGRAM(s) IV Intermittent daily  glucagon  Injectable 1 milliGRAM(s) IntraMuscular once  insulin glargine Injectable (LANTUS) 5 Unit(s) SubCutaneous at bedtime  insulin lispro (ADMELOG) corrective regimen sliding scale   SubCutaneous every 6 hours  sodium chloride 0.9% lock flush 3 milliLiter(s) IV Push every 8 hours  sodium chloride 0.9%. 1000 milliLiter(s) (100 mL/Hr) IV Continuous <Continuous>    MEDICATIONS  (PRN):  acetaminophen     Tablet .. 650 milliGRAM(s) Oral every 6 hours PRN Temp greater or equal to 38C (100.4F), Mild Pain (1 - 3)  aluminum hydroxide/magnesium hydroxide/simethicone Suspension 30 milliLiter(s) Oral every 4 hours PRN Dyspepsia  dextrose Oral Gel 15 Gram(s) Oral once PRN Blood Glucose LESS THAN 70 milliGRAM(s)/deciliter  melatonin 3 milliGRAM(s) Oral at bedtime PRN Insomnia  morphine  - Injectable 2 milliGRAM(s) IV Push every 4 hours PRN Moderate Pain (4 - 6)  ondansetron Injectable 4 milliGRAM(s) IV Push every 8 hours PRN Nausea and/or Vomiting    ALLERGIES:  No Known Allergies    T(C): 36.9 (08-07-23 @ 04:41), Max: 36.9 (08-07-23 @ 04:41)  HR: 75 (08-07-23 @ 04:41) (64 - 78)  BP: 123/75 (08-07-23 @ 04:41) (123/75 - 141/79)  RR: 18 (08-07-23 @ 04:41) (18 - 20)  SpO2: 99% (08-07-23 @ 04:41) (98% - 99%)      PHYSICAL EXAM:    Constitutional: No acute distress  Neuro: Awake alert, oriented  HEENT: anicteric sclerae  Neck: supple, no JVD  CV: regular rate, regular rhythm  Pulm/chest: lung sounds CTA and equal bilaterally, no accessory muscle use noted  Abd: soft, nontender, nondistended +bowel sounds. No rigidity, rebound tenderness, or guarding  Ext: no Cyanosis, clubbing or edema  Skin: warm, no jaundice   Psych: calm, cooperative      LABS:             12.0   7.38  )-----------( 255      ( 08-07 @ 06:34 )             36.9                11.3   8.48  )-----------( 225      ( 08-06 @ 20:56 )             33.7       PT/INR - ( 06 Aug 2023 20:56 )   PT: 12.3 sec;   INR: 1.11 ratio         PTT - ( 06 Aug 2023 20:56 )  PTT:25.4 sec  08-07    140  |  108  |  8.9  ----------------------------<  248<H>  3.9   |  19.0<L>  |  0.51    Ca    8.5      07 Aug 2023 06:34  Phos  2.9     08-07  Mg     1.8     08-07    TPro  6.9  /  Alb  3.6  /  TBili  0.5  /  DBili  x   /  AST  18  /  ALT  26  /  AlkPhos  80  08-07    LIVER FUNCTIONS - ( 07 Aug 2023 06:34 )  Alb: 3.6 g/dL / Pro: 6.9 g/dL / ALK PHOS: 80 U/L / ALT: 26 U/L / AST: 18 U/L / GGT: x                 Urinalysis Basic - ( 07 Aug 2023 06:34 )    Color: x / Appearance: x / SG: x / pH: x  Gluc: 248 mg/dL / Ketone: x  / Bili: x / Urobili: x   Blood: x / Protein: x / Nitrite: x   Leuk Esterase: x / RBC: x / WBC x   Sq Epi: x / Non Sq Epi: x / Bacteria: x            INTERPRETATION:  CLINICAL INFORMATION: Dilated CBD    COMPARISON: Abdominal ultrasound dated 08/06/2023    CONTRAST/COMPLICATIONS:  IV Contrast: NONE  Oral Contrast: NONE  Complications: None reported at time of study completion    PROCEDURE:  MRI/MRCP was performed. Radial and 3D MRCP sequences were obtained.      FINDINGS:  Limited study without intravenous contrast. Many of the sequences are also partially limited due to respiratory motion artifact.    LOWER CHEST: Within normal limits.    LIVER: Steatosis. Hepatomegaly with relative enlargement of the right hepatic lobe/Riedel's lobe.  BILE DUCTS: Localized mild to moderate intrahepatic ductal dilatation at the junctions of segments 2 and 4. Difficult to localize 14 mm T1 hyperintense nodule at the site of biliary obstruction, possibly an intraductal stone or proteinaceous material within a dilated ectatic duct versus parenchymal or vascular in location. Mild extrahepatic dilatation without definite cause identified, upper common bile duct measuring 8 mm. Tapered appearance of the distal duct above the ampulla.  GALLBLADDER: Within normal limits.  SPLEEN: Within normal limits.  PANCREAS: Within normal limits.  ADRENALS: Within normal limits.  KIDNEYS/URETERS: Within normal limits.    VISUALIZED PORTIONS:  BOWEL: Within normal limits.  PERITONEUM: No ascites.  VESSELS: Within normal limits.  RETROPERITONEUM/LYMPH NODES: No lymphadenopathy.  ABDOMINAL WALL: To fat-containing umbilical hernia.  BONES: Within normal limits.    IMPRESSION:  1.  Localized left hepatic lobe intraductal dilatation. Difficult to localize T1 hyperintense nodule at the site of biliary obstruction, possibly representing an intraductal stone/proteinaceous material (such as in the setting of biliary stricture, recurrent pyogenic cholangitis, or Caroli's disease) versus portal venous thrombus or adjacent parenchymal lesion.  2.  Nonspecific extrahepatic ductal dilatation without specific cause identified. Consider ampullary stenosis, occult ampullary lesion, or stricture.  3.  Hepatic steatosis and hepatomegaly.    Recommend repeat MRI/MRCP with and without contrast.    US abdomen (08/06/2023):     COMPARISON: None available.    TECHNIQUE: Sonography of the right upper quadrant.    FINDINGS:  Liver: Diffuse fatty infiltration.  Bile ducts: Mild intrahepatic biliary ductal dilatation. Common bile duct measures 8 mm.  Gallbladder: Within normal limits.  Pancreas: Visualized portions are within normal limits.  Right kidney: 10.4 cm. No hydronephrosis.  Ascites: None.  IVC: Visualized portions are within normal limits.    IMPRESSION:  Hepatic steatosis.    Mild intrahepatic biliary ductal dilatation and mild common bile duct dilatation without identifiable cause. Further evaluation with contrast-enhanced CT scan of the abdomen recommended. HISTORY OF PRESENT ILLNESS: 64 y/o female with hx of DM-2, HTN, HLD, GERD presented to ED after transferred from [facility] [place] after radiology finding of localized left hepatic intraductal dilation on MRCP. Patient was originally presented to [facility] with c/o RUQ pain, Nausea,  NBNB emesis, and diarrhea x1 say. Reported as sudden onset and denies sick contact. Reports similar symptoms 1 year ago and for which she was seen at the hospital. No prior surgeries. No new medications. Patient is uncertain about the weight loss. She use of alcohol, smoking or illicit drug use. She denies family history of cancer. On arrival to [facility] ED her labs shows elevated blood glucose 399, alk ph 140, ALT 41, normal AST, normal TB. Mild leucocytosis 15 K which is now normalized. Patient is afebrile.  RUQ sono with mild CBD dilation, MRCP w/ localized left hepatic intraductal dilation concerning for neoplasm, stone, or stricture. Her LFTs are now normalized. Patient reports some epigastric and RUQ pain. No tenderness. Nausea, vomiting and diarrhea now resolved. Patient requesting to eat breakfast.    ID [id number] was used.       Review of Systems:  . Constitutional: No fever, chills  . HEENT: Negative  · Respiratory and Thorax: No shortness of breath, no cough  · Cardiovascular: No chest pain, palpitation, no dizziness  · Gastrointestinal: see above  · Genitourinary: No hematuria  · Musculoskeletal: Negative  · Neurological: negative  · Psychiatric: no agitation, no anxiety      PAST MEDICAL/SURGICAL HISTORY:  HTN (hypertension)    HLD (hyperlipidemia)    Diabetes    GERD (gastroesophageal reflux disease)    No significant past surgical history      SOCIAL HISTORY:  - TOBACCO: Denies  - ALCOHOL: Denies  - ILLICIT DRUG USE: Denies    FAMILY HISTORY:  No known history of gastrointestinal or liver disease;  No pertinent family history in first degree relatives        HOME MEDICATIONS:  glimepiride 4 mg oral tablet: 1 orally once a day (07 Aug 2023 01:25)  Janumet 50 mg-500 mg oral tablet: 1 orally once a day (07 Aug 2023 01:25)  Lipitor 20 mg oral tablet: 1 orally once a day (07 Aug 2023 01:25)  lisinopril 20 mg oral tablet: 1 orally once a day (07 Aug 2023 01:25)  Protonix 40 mg oral delayed release tablet: 1 orally once a day (07 Aug 2023 01:25)    INPATIENT MEDICATIONS:  MEDICATIONS  (STANDING):  dextrose 5%. 1000 milliLiter(s) (50 mL/Hr) IV Continuous <Continuous>  dextrose 5%. 1000 milliLiter(s) (100 mL/Hr) IV Continuous <Continuous>  dextrose 50% Injectable 25 Gram(s) IV Push once  dextrose 50% Injectable 12.5 Gram(s) IV Push once  dextrose 50% Injectable 25 Gram(s) IV Push once  famotidine  IVPB 20 milliGRAM(s) IV Intermittent daily  glucagon  Injectable 1 milliGRAM(s) IntraMuscular once  insulin glargine Injectable (LANTUS) 5 Unit(s) SubCutaneous at bedtime  insulin lispro (ADMELOG) corrective regimen sliding scale   SubCutaneous every 6 hours  sodium chloride 0.9% lock flush 3 milliLiter(s) IV Push every 8 hours  sodium chloride 0.9%. 1000 milliLiter(s) (100 mL/Hr) IV Continuous <Continuous>    MEDICATIONS  (PRN):  acetaminophen     Tablet .. 650 milliGRAM(s) Oral every 6 hours PRN Temp greater or equal to 38C (100.4F), Mild Pain (1 - 3)  aluminum hydroxide/magnesium hydroxide/simethicone Suspension 30 milliLiter(s) Oral every 4 hours PRN Dyspepsia  dextrose Oral Gel 15 Gram(s) Oral once PRN Blood Glucose LESS THAN 70 milliGRAM(s)/deciliter  melatonin 3 milliGRAM(s) Oral at bedtime PRN Insomnia  morphine  - Injectable 2 milliGRAM(s) IV Push every 4 hours PRN Moderate Pain (4 - 6)  ondansetron Injectable 4 milliGRAM(s) IV Push every 8 hours PRN Nausea and/or Vomiting    ALLERGIES:  No Known Allergies    T(C): 36.9 (08-07-23 @ 04:41), Max: 36.9 (08-07-23 @ 04:41)  HR: 75 (08-07-23 @ 04:41) (64 - 78)  BP: 123/75 (08-07-23 @ 04:41) (123/75 - 141/79)  RR: 18 (08-07-23 @ 04:41) (18 - 20)  SpO2: 99% (08-07-23 @ 04:41) (98% - 99%)      PHYSICAL EXAM:    Constitutional: No acute distress  Neuro: Awake alert, oriented  HEENT: anicteric sclerae  Neck: supple, no JVD  CV: regular rate, regular rhythm  Pulm/chest: lung sounds CTA and equal bilaterally, no accessory muscle use noted  Abd: soft, nontender, nondistended +bowel sounds. No rigidity, rebound tenderness, or guarding  Ext: no Cyanosis, clubbing or edema  Skin: warm, no jaundice   Psych: calm, cooperative      LABS:             12.0   7.38  )-----------( 255      ( 08-07 @ 06:34 )             36.9                11.3   8.48  )-----------( 225      ( 08-06 @ 20:56 )             33.7       PT/INR - ( 06 Aug 2023 20:56 )   PT: 12.3 sec;   INR: 1.11 ratio         PTT - ( 06 Aug 2023 20:56 )  PTT:25.4 sec  08-07    140  |  108  |  8.9  ----------------------------<  248<H>  3.9   |  19.0<L>  |  0.51    Ca    8.5      07 Aug 2023 06:34  Phos  2.9     08-07  Mg     1.8     08-07    TPro  6.9  /  Alb  3.6  /  TBili  0.5  /  DBili  x   /  AST  18  /  ALT  26  /  AlkPhos  80  08-07    LIVER FUNCTIONS - ( 07 Aug 2023 06:34 )  Alb: 3.6 g/dL / Pro: 6.9 g/dL / ALK PHOS: 80 U/L / ALT: 26 U/L / AST: 18 U/L / GGT: x                 Urinalysis Basic - ( 07 Aug 2023 06:34 )    Color: x / Appearance: x / SG: x / pH: x  Gluc: 248 mg/dL / Ketone: x  / Bili: x / Urobili: x   Blood: x / Protein: x / Nitrite: x   Leuk Esterase: x / RBC: x / WBC x   Sq Epi: x / Non Sq Epi: x / Bacteria: x            INTERPRETATION:  CLINICAL INFORMATION: Dilated CBD    COMPARISON: Abdominal ultrasound dated 08/06/2023    CONTRAST/COMPLICATIONS:  IV Contrast: NONE  Oral Contrast: NONE  Complications: None reported at time of study completion    PROCEDURE:  MRI/MRCP was performed. Radial and 3D MRCP sequences were obtained.      FINDINGS:  Limited study without intravenous contrast. Many of the sequences are also partially limited due to respiratory motion artifact.    LOWER CHEST: Within normal limits.    LIVER: Steatosis. Hepatomegaly with relative enlargement of the right hepatic lobe/Riedel's lobe.  BILE DUCTS: Localized mild to moderate intrahepatic ductal dilatation at the junctions of segments 2 and 4. Difficult to localize 14 mm T1 hyperintense nodule at the site of biliary obstruction, possibly an intraductal stone or proteinaceous material within a dilated ectatic duct versus parenchymal or vascular in location. Mild extrahepatic dilatation without definite cause identified, upper common bile duct measuring 8 mm. Tapered appearance of the distal duct above the ampulla.  GALLBLADDER: Within normal limits.  SPLEEN: Within normal limits.  PANCREAS: Within normal limits.  ADRENALS: Within normal limits.  KIDNEYS/URETERS: Within normal limits.    VISUALIZED PORTIONS:  BOWEL: Within normal limits.  PERITONEUM: No ascites.  VESSELS: Within normal limits.  RETROPERITONEUM/LYMPH NODES: No lymphadenopathy.  ABDOMINAL WALL: To fat-containing umbilical hernia.  BONES: Within normal limits.    IMPRESSION:  1.  Localized left hepatic lobe intraductal dilatation. Difficult to localize T1 hyperintense nodule at the site of biliary obstruction, possibly representing an intraductal stone/proteinaceous material (such as in the setting of biliary stricture, recurrent pyogenic cholangitis, or Caroli's disease) versus portal venous thrombus or adjacent parenchymal lesion.  2.  Nonspecific extrahepatic ductal dilatation without specific cause identified. Consider ampullary stenosis, occult ampullary lesion, or stricture.  3.  Hepatic steatosis and hepatomegaly.    Recommend repeat MRI/MRCP with and without contrast.    US abdomen (08/06/2023):     COMPARISON: None available.    TECHNIQUE: Sonography of the right upper quadrant.    FINDINGS:  Liver: Diffuse fatty infiltration.  Bile ducts: Mild intrahepatic biliary ductal dilatation. Common bile duct measures 8 mm.  Gallbladder: Within normal limits.  Pancreas: Visualized portions are within normal limits.  Right kidney: 10.4 cm. No hydronephrosis.  Ascites: None.  IVC: Visualized portions are within normal limits.    IMPRESSION:  Hepatic steatosis.    Mild intrahepatic biliary ductal dilatation and mild common bile duct dilatation without identifiable cause. Further evaluation with contrast-enhanced CT scan of the abdomen recommended. HISTORY OF PRESENT ILLNESS: 62 y/o female with hx of DM-2, HTN, HLD, GERD presented to ED [place] [facility] [place]ology finding of localized left hepatic intraductal dilation on MRCP. Patient was originally presented to [facility] [place] with c/o RUQ pain, Nausea,  NBNB emesis, and diarrhea x1 say. Reported as sudden onset and denies sick contact. Reports similar symptoms 1 year ago and for which she was seen at the hospital. No prior surgeries. No new medications. Patient is uncertain about the weight loss. She use of alcohol, smoking or illicit drug use. She denies family history of cancer. On arrival to [facility] [place] ED her labs shows elevated blood glucose 399, alk ph 140, ALT 41, normal AST, normal TB. Mild leucocytosis 15 K which is now normalized. Patient is afebrile.  RUQ sono with mild CBD dilation, measuring 8 mm, MRCP w/ localized left hepatic intraductal dilation concerning for neoplasm, stone, or stricture. Her LFTs are now normalized. Patient reports some epigastric and RUQ pain. No tenderness. Nausea, vomiting and diarrhea now resolved. Patient requesting to eat breakfast.    ID [id number] was used.       Review of Systems:  . Constitutional: No fever, chills  . HEENT: Negative  · Respiratory and Thorax: No shortness of breath, no cough  · Cardiovascular: No chest pain, palpitation, no dizziness  · Gastrointestinal: see above  · Genitourinary: No hematuria  · Musculoskeletal: Negative  · Neurological: negative  · Psychiatric: no agitation, no anxiety      PAST MEDICAL/SURGICAL HISTORY:  HTN (hypertension)    HLD (hyperlipidemia)    Diabetes    GERD (gastroesophageal reflux disease)    No significant past surgical history      SOCIAL HISTORY:  - TOBACCO: Denies  - ALCOHOL: Denies  - ILLICIT DRUG USE: Denies    FAMILY HISTORY:  No known history of gastrointestinal or liver disease;  No pertinent family history in first degree relatives        HOME MEDICATIONS:  glimepiride 4 mg oral tablet: 1 orally once a day (07 Aug 2023 01:25)  Janumet 50 mg-500 mg oral tablet: 1 orally once a day (07 Aug 2023 01:25)  Lipitor 20 mg oral tablet: 1 orally once a day (07 Aug 2023 01:25)  lisinopril 20 mg oral tablet: 1 orally once a day (07 Aug 2023 01:25)  Protonix 40 mg oral delayed release tablet: 1 orally once a day (07 Aug 2023 01:25)    INPATIENT MEDICATIONS:  MEDICATIONS  (STANDING):  dextrose 5%. 1000 milliLiter(s) (50 mL/Hr) IV Continuous <Continuous>  dextrose 5%. 1000 milliLiter(s) (100 mL/Hr) IV Continuous <Continuous>  dextrose 50% Injectable 25 Gram(s) IV Push once  dextrose 50% Injectable 12.5 Gram(s) IV Push once  dextrose 50% Injectable 25 Gram(s) IV Push once  famotidine  IVPB 20 milliGRAM(s) IV Intermittent daily  glucagon  Injectable 1 milliGRAM(s) IntraMuscular once  insulin glargine Injectable (LANTUS) 5 Unit(s) SubCutaneous at bedtime  insulin lispro (ADMELOG) corrective regimen sliding scale   SubCutaneous every 6 hours  sodium chloride 0.9% lock flush 3 milliLiter(s) IV Push every 8 hours  sodium chloride 0.9%. 1000 milliLiter(s) (100 mL/Hr) IV Continuous <Continuous>    MEDICATIONS  (PRN):  acetaminophen     Tablet .. 650 milliGRAM(s) Oral every 6 hours PRN Temp greater or equal to 38C (100.4F), Mild Pain (1 - 3)  aluminum hydroxide/magnesium hydroxide/simethicone Suspension 30 milliLiter(s) Oral every 4 hours PRN Dyspepsia  dextrose Oral Gel 15 Gram(s) Oral once PRN Blood Glucose LESS THAN 70 milliGRAM(s)/deciliter  melatonin 3 milliGRAM(s) Oral at bedtime PRN Insomnia  morphine  - Injectable 2 milliGRAM(s) IV Push every 4 hours PRN Moderate Pain (4 - 6)  ondansetron Injectable 4 milliGRAM(s) IV Push every 8 hours PRN Nausea and/or Vomiting    ALLERGIES:  No Known Allergies    T(C): 36.9 (08-07-23 @ 04:41), Max: 36.9 (08-07-23 @ 04:41)  HR: 75 (08-07-23 @ 04:41) (64 - 78)  BP: 123/75 (08-07-23 @ 04:41) (123/75 - 141/79)  RR: 18 (08-07-23 @ 04:41) (18 - 20)  SpO2: 99% (08-07-23 @ 04:41) (98% - 99%)      PHYSICAL EXAM:    Constitutional: No acute distress  Neuro: Awake alert, oriented  HEENT: anicteric sclerae  Neck: supple, no JVD  CV: regular rate, regular rhythm  Pulm/chest: lung sounds CTA and equal bilaterally, no accessory muscle use noted  Abd: soft, nontender, nondistended +bowel sounds. No rigidity, rebound tenderness, or guarding  Ext: no Cyanosis, clubbing or edema  Skin: warm, no jaundice   Psych: calm, cooperative      LABS:             12.0   7.38  )-----------( 255      ( 08-07 @ 06:34 )             36.9                11.3   8.48  )-----------( 225      ( 08-06 @ 20:56 )             33.7       PT/INR - ( 06 Aug 2023 20:56 )   PT: 12.3 sec;   INR: 1.11 ratio         PTT - ( 06 Aug 2023 20:56 )  PTT:25.4 sec  08-07    140  |  108  |  8.9  ----------------------------<  248<H>  3.9   |  19.0<L>  |  0.51    Ca    8.5      07 Aug 2023 06:34  Phos  2.9     08-07  Mg     1.8     08-07    TPro  6.9  /  Alb  3.6  /  TBili  0.5  /  DBili  x   /  AST  18  /  ALT  26  /  AlkPhos  80  08-07    LIVER FUNCTIONS - ( 07 Aug 2023 06:34 )  Alb: 3.6 g/dL / Pro: 6.9 g/dL / ALK PHOS: 80 U/L / ALT: 26 U/L / AST: 18 U/L / GGT: x                 Urinalysis Basic - ( 07 Aug 2023 06:34 )    Color: x / Appearance: x / SG: x / pH: x  Gluc: 248 mg/dL / Ketone: x  / Bili: x / Urobili: x   Blood: x / Protein: x / Nitrite: x   Leuk Esterase: x / RBC: x / WBC x   Sq Epi: x / Non Sq Epi: x / Bacteria: x            INTERPRETATION:  CLINICAL INFORMATION: Dilated CBD    COMPARISON: Abdominal ultrasound dated 08/06/2023    CONTRAST/COMPLICATIONS:  IV Contrast: NONE  Oral Contrast: NONE  Complications: None reported at time of study completion    PROCEDURE:  MRI/MRCP was performed. Radial and 3D MRCP sequences were obtained.      FINDINGS:  Limited study without intravenous contrast. Many of the sequences are also partially limited due to respiratory motion artifact.    LOWER CHEST: Within normal limits.    LIVER: Steatosis. Hepatomegaly with relative enlargement of the right hepatic lobe/Riedel's lobe.  BILE DUCTS: Localized mild to moderate intrahepatic ductal dilatation at the junctions of segments 2 and 4. Difficult to localize 14 mm T1 hyperintense nodule at the site of biliary obstruction, possibly an intraductal stone or proteinaceous material within a dilated ectatic duct versus parenchymal or vascular in location. Mild extrahepatic dilatation without definite cause identified, upper common bile duct measuring 8 mm. Tapered appearance of the distal duct above the ampulla.  GALLBLADDER: Within normal limits.  SPLEEN: Within normal limits.  PANCREAS: Within normal limits.  ADRENALS: Within normal limits.  KIDNEYS/URETERS: Within normal limits.    VISUALIZED PORTIONS:  BOWEL: Within normal limits.  PERITONEUM: No ascites.  VESSELS: Within normal limits.  RETROPERITONEUM/LYMPH NODES: No lymphadenopathy.  ABDOMINAL WALL: To fat-containing umbilical hernia.  BONES: Within normal limits.    IMPRESSION:  1.  Localized left hepatic lobe intraductal dilatation. Difficult to localize T1 hyperintense nodule at the site of biliary obstruction, possibly representing an intraductal stone/proteinaceous material (such as in the setting of biliary stricture, recurrent pyogenic cholangitis, or Caroli's disease) versus portal venous thrombus or adjacent parenchymal lesion.  2.  Nonspecific extrahepatic ductal dilatation without specific cause identified. Consider ampullary stenosis, occult ampullary lesion, or stricture.  3.  Hepatic steatosis and hepatomegaly.    Recommend repeat MRI/MRCP with and without contrast.    US abdomen (08/06/2023):     COMPARISON: None available.    TECHNIQUE: Sonography of the right upper quadrant.    FINDINGS:  Liver: Diffuse fatty infiltration.  Bile ducts: Mild intrahepatic biliary ductal dilatation. Common bile duct measures 8 mm.  Gallbladder: Within normal limits.  Pancreas: Visualized portions are within normal limits.  Right kidney: 10.4 cm. No hydronephrosis.  Ascites: None.  IVC: Visualized portions are within normal limits.    IMPRESSION:  Hepatic steatosis.    Mild intrahepatic biliary ductal dilatation and mild common bile duct dilatation without identifiable cause. Further evaluation with contrast-enhanced CT scan of the abdomen recommended. HISTORY OF PRESENT ILLNESS: 64 y/o female with hx of DM-2, HTN, HLD, GERD [place] [facility] [place] of localized left hepatic intraductal dilation on MRCP. Patient was [place] [facility] [place]/o RUQ pain, Nausea,  NBNB emesis, and diarrhea x1 say. Reported as sudden onset and denies sick contact. Reports similar symptoms 1 year ago and for which she was seen at the hospital. No prior surgeries. No new medications. Patient is uncertain about the weight loss. She use of alcohol, smoking or illicit drug use. She denies family history of cancer. On [place] [facility] [place] labs shows elevated blood glucose 399, alk ph 140, ALT 41, normal AST, normal TB. Mild leucocytosis 15 K which is now normalized. Patient is afebrile.  RUQ sono with mild CBD dilation, measuring 8 mm, MRCP w/ localized left hepatic intraductal dilation concerning for neoplasm, stone, or stricture. Her LFTs are now normalized. Patient reports some epigastric and RUQ pain. No tenderness. Nausea, vomiting and diarrhea now resolved. Patient requesting to eat breakfast.    ID [id number] was used.       Review of Systems:  . Constitutional: No fever, chills  . HEENT: Negative  · Respiratory and Thorax: No shortness of breath, no cough  · Cardiovascular: No chest pain, palpitation, no dizziness  · Gastrointestinal: see above  · Genitourinary: No hematuria  · Musculoskeletal: Negative  · Neurological: negative  · Psychiatric: no agitation, no anxiety      PAST MEDICAL/SURGICAL HISTORY:  HTN (hypertension)    HLD (hyperlipidemia)    Diabetes    GERD (gastroesophageal reflux disease)    No significant past surgical history      SOCIAL HISTORY:  - TOBACCO: Denies  - ALCOHOL: Denies  - ILLICIT DRUG USE: Denies    FAMILY HISTORY:  No known history of gastrointestinal or liver disease;  No pertinent family history in first degree relatives        HOME MEDICATIONS:  glimepiride 4 mg oral tablet: 1 orally once a day (07 Aug 2023 01:25)  Janumet 50 mg-500 mg oral tablet: 1 orally once a day (07 Aug 2023 01:25)  Lipitor 20 mg oral tablet: 1 orally once a day (07 Aug 2023 01:25)  lisinopril 20 mg oral tablet: 1 orally once a day (07 Aug 2023 01:25)  Protonix 40 mg oral delayed release tablet: 1 orally once a day (07 Aug 2023 01:25)    INPATIENT MEDICATIONS:  MEDICATIONS  (STANDING):  dextrose 5%. 1000 milliLiter(s) (50 mL/Hr) IV Continuous <Continuous>  dextrose 5%. 1000 milliLiter(s) (100 mL/Hr) IV Continuous <Continuous>  dextrose 50% Injectable 25 Gram(s) IV Push once  dextrose 50% Injectable 12.5 Gram(s) IV Push once  dextrose 50% Injectable 25 Gram(s) IV Push once  famotidine  IVPB 20 milliGRAM(s) IV Intermittent daily  glucagon  Injectable 1 milliGRAM(s) IntraMuscular once  insulin glargine Injectable (LANTUS) 5 Unit(s) SubCutaneous at bedtime  insulin lispro (ADMELOG) corrective regimen sliding scale   SubCutaneous every 6 hours  sodium chloride 0.9% lock flush 3 milliLiter(s) IV Push every 8 hours  sodium chloride 0.9%. 1000 milliLiter(s) (100 mL/Hr) IV Continuous <Continuous>    MEDICATIONS  (PRN):  acetaminophen     Tablet .. 650 milliGRAM(s) Oral every 6 hours PRN Temp greater or equal to 38C (100.4F), Mild Pain (1 - 3)  aluminum hydroxide/magnesium hydroxide/simethicone Suspension 30 milliLiter(s) Oral every 4 hours PRN Dyspepsia  dextrose Oral Gel 15 Gram(s) Oral once PRN Blood Glucose LESS THAN 70 milliGRAM(s)/deciliter  melatonin 3 milliGRAM(s) Oral at bedtime PRN Insomnia  morphine  - Injectable 2 milliGRAM(s) IV Push every 4 hours PRN Moderate Pain (4 - 6)  ondansetron Injectable 4 milliGRAM(s) IV Push every 8 hours PRN Nausea and/or Vomiting    ALLERGIES:  No Known Allergies    T(C): 36.9 (08-07-23 @ 04:41), Max: 36.9 (08-07-23 @ 04:41)  HR: 75 (08-07-23 @ 04:41) (64 - 78)  BP: 123/75 (08-07-23 @ 04:41) (123/75 - 141/79)  RR: 18 (08-07-23 @ 04:41) (18 - 20)  SpO2: 99% (08-07-23 @ 04:41) (98% - 99%)      PHYSICAL EXAM:    Constitutional: No acute distress  Neuro: Awake alert, oriented  HEENT: anicteric sclerae  Neck: supple, no JVD  CV: regular rate, regular rhythm  Pulm/chest: lung sounds CTA and equal bilaterally, no accessory muscle use noted  Abd: soft, nontender, nondistended +bowel sounds. No rigidity, rebound tenderness, or guarding  Ext: no Cyanosis, clubbing or edema  Skin: warm, no jaundice   Psych: calm, cooperative      LABS:             12.0   7.38  )-----------( 255      ( 08-07 @ 06:34 )             36.9                11.3   8.48  )-----------( 225      ( 08-06 @ 20:56 )             33.7       PT/INR - ( 06 Aug 2023 20:56 )   PT: 12.3 sec;   INR: 1.11 ratio         PTT - ( 06 Aug 2023 20:56 )  PTT:25.4 sec  08-07    140  |  108  |  8.9  ----------------------------<  248<H>  3.9   |  19.0<L>  |  0.51    Ca    8.5      07 Aug 2023 06:34  Phos  2.9     08-07  Mg     1.8     08-07    TPro  6.9  /  Alb  3.6  /  TBili  0.5  /  DBili  x   /  AST  18  /  ALT  26  /  AlkPhos  80  08-07    LIVER FUNCTIONS - ( 07 Aug 2023 06:34 )  Alb: 3.6 g/dL / Pro: 6.9 g/dL / ALK PHOS: 80 U/L / ALT: 26 U/L / AST: 18 U/L / GGT: x                 Urinalysis Basic - ( 07 Aug 2023 06:34 )    Color: x / Appearance: x / SG: x / pH: x  Gluc: 248 mg/dL / Ketone: x  / Bili: x / Urobili: x   Blood: x / Protein: x / Nitrite: x   Leuk Esterase: x / RBC: x / WBC x   Sq Epi: x / Non Sq Epi: x / Bacteria: x            INTERPRETATION:  CLINICAL INFORMATION: Dilated CBD    COMPARISON: Abdominal ultrasound dated 08/06/2023    CONTRAST/COMPLICATIONS:  IV Contrast: NONE  Oral Contrast: NONE  Complications: None reported at time of study completion    PROCEDURE:  MRI/MRCP was performed. Radial and 3D MRCP sequences were obtained.      FINDINGS:  Limited study without intravenous contrast. Many of the sequences are also partially limited due to respiratory motion artifact.    LOWER CHEST: Within normal limits.    LIVER: Steatosis. Hepatomegaly with relative enlargement of the right hepatic lobe/Riedel's lobe.  BILE DUCTS: Localized mild to moderate intrahepatic ductal dilatation at the junctions of segments 2 and 4. Difficult to localize 14 mm T1 hyperintense nodule at the site of biliary obstruction, possibly an intraductal stone or proteinaceous material within a dilated ectatic duct versus parenchymal or vascular in location. Mild extrahepatic dilatation without definite cause identified, upper common bile duct measuring 8 mm. Tapered appearance of the distal duct above the ampulla.  GALLBLADDER: Within normal limits.  SPLEEN: Within normal limits.  PANCREAS: Within normal limits.  ADRENALS: Within normal limits.  KIDNEYS/URETERS: Within normal limits.    VISUALIZED PORTIONS:  BOWEL: Within normal limits.  PERITONEUM: No ascites.  VESSELS: Within normal limits.  RETROPERITONEUM/LYMPH NODES: No lymphadenopathy.  ABDOMINAL WALL: To fat-containing umbilical hernia.  BONES: Within normal limits.    IMPRESSION:  1.  Localized left hepatic lobe intraductal dilatation. Difficult to localize T1 hyperintense nodule at the site of biliary obstruction, possibly representing an intraductal stone/proteinaceous material (such as in the setting of biliary stricture, recurrent pyogenic cholangitis, or Caroli's disease) versus portal venous thrombus or adjacent parenchymal lesion.  2.  Nonspecific extrahepatic ductal dilatation without specific cause identified. Consider ampullary stenosis, occult ampullary lesion, or stricture.  3.  Hepatic steatosis and hepatomegaly.    Recommend repeat MRI/MRCP with and without contrast.    US abdomen (08/06/2023):     COMPARISON: None available.    TECHNIQUE: Sonography of the right upper quadrant.    FINDINGS:  Liver: Diffuse fatty infiltration.  Bile ducts: Mild intrahepatic biliary ductal dilatation. Common bile duct measures 8 mm.  Gallbladder: Within normal limits.  Pancreas: Visualized portions are within normal limits.  Right kidney: 10.4 cm. No hydronephrosis.  Ascites: None.  IVC: Visualized portions are within normal limits.    IMPRESSION:  Hepatic steatosis.    Mild intrahepatic biliary ductal dilatation and mild common bile duct dilatation without identifiable cause. Further evaluation with contrast-enhanced CT scan of the abdomen recommended. HISTORY OF PRESENT ILLNESS: 64 y/o female with hx of DM-2, HTN, HLD, GERD [place] [facility] [place] of localized left hepatic intraductal dilation on MRCP. Patient was [place] [facility] [place]/o RUQ pain, Nausea,  NBNB emesis, and diarrhea x1 say. Reported as sudden onset and denies sick contact. Reports similar symptoms 1 year ago and for which she was seen at the hospital. No prior surgeries. No new medications. Patient is uncertain about the weight loss. She use of alcohol, smoking or illicit drug use. She denies family history of cancer. [place] [facility] [place] labs shows elevated blood glucose 399, alk ph 140, ALT 41, normal AST, normal TB. Mild leucocytosis 15 K which is now normalized. Patient is afebrile.  RUQ sono with mild CBD dilation, measuring 8 mm, MRCP w/ localized left hepatic intraductal dilation concerning for neoplasm, stone, or stricture. Her LFTs are now normalized. Patient reports some epigastric and RUQ pain. No tenderness. Nausea, vomiting and diarrhea now resolved. Patient requesting to eat breakfast.    ID [id number] was used.       Review of Systems:  . Constitutional: No fever, chills  . HEENT: Negative  · Respiratory and Thorax: No shortness of breath, no cough  · Cardiovascular: No chest pain, palpitation, no dizziness  · Gastrointestinal: see above  · Genitourinary: No hematuria  · Musculoskeletal: Negative  · Neurological: negative  · Psychiatric: no agitation, no anxiety      PAST MEDICAL/SURGICAL HISTORY:  HTN (hypertension)    HLD (hyperlipidemia)    Diabetes    GERD (gastroesophageal reflux disease)    No significant past surgical history      SOCIAL HISTORY:  - TOBACCO: Denies  - ALCOHOL: Denies  - ILLICIT DRUG USE: Denies    FAMILY HISTORY:  No known history of gastrointestinal or liver disease;  No pertinent family history in first degree relatives        HOME MEDICATIONS:  glimepiride 4 mg oral tablet: 1 orally once a day (07 Aug 2023 01:25)  Janumet 50 mg-500 mg oral tablet: 1 orally once a day (07 Aug 2023 01:25)  Lipitor 20 mg oral tablet: 1 orally once a day (07 Aug 2023 01:25)  lisinopril 20 mg oral tablet: 1 orally once a day (07 Aug 2023 01:25)  Protonix 40 mg oral delayed release tablet: 1 orally once a day (07 Aug 2023 01:25)    INPATIENT MEDICATIONS:  MEDICATIONS  (STANDING):  dextrose 5%. 1000 milliLiter(s) (50 mL/Hr) IV Continuous <Continuous>  dextrose 5%. 1000 milliLiter(s) (100 mL/Hr) IV Continuous <Continuous>  dextrose 50% Injectable 25 Gram(s) IV Push once  dextrose 50% Injectable 12.5 Gram(s) IV Push once  dextrose 50% Injectable 25 Gram(s) IV Push once  famotidine  IVPB 20 milliGRAM(s) IV Intermittent daily  glucagon  Injectable 1 milliGRAM(s) IntraMuscular once  insulin glargine Injectable (LANTUS) 5 Unit(s) SubCutaneous at bedtime  insulin lispro (ADMELOG) corrective regimen sliding scale   SubCutaneous every 6 hours  sodium chloride 0.9% lock flush 3 milliLiter(s) IV Push every 8 hours  sodium chloride 0.9%. 1000 milliLiter(s) (100 mL/Hr) IV Continuous <Continuous>    MEDICATIONS  (PRN):  acetaminophen     Tablet .. 650 milliGRAM(s) Oral every 6 hours PRN Temp greater or equal to 38C (100.4F), Mild Pain (1 - 3)  aluminum hydroxide/magnesium hydroxide/simethicone Suspension 30 milliLiter(s) Oral every 4 hours PRN Dyspepsia  dextrose Oral Gel 15 Gram(s) Oral once PRN Blood Glucose LESS THAN 70 milliGRAM(s)/deciliter  melatonin 3 milliGRAM(s) Oral at bedtime PRN Insomnia  morphine  - Injectable 2 milliGRAM(s) IV Push every 4 hours PRN Moderate Pain (4 - 6)  ondansetron Injectable 4 milliGRAM(s) IV Push every 8 hours PRN Nausea and/or Vomiting    ALLERGIES:  No Known Allergies    T(C): 36.9 (08-07-23 @ 04:41), Max: 36.9 (08-07-23 @ 04:41)  HR: 75 (08-07-23 @ 04:41) (64 - 78)  BP: 123/75 (08-07-23 @ 04:41) (123/75 - 141/79)  RR: 18 (08-07-23 @ 04:41) (18 - 20)  SpO2: 99% (08-07-23 @ 04:41) (98% - 99%)      PHYSICAL EXAM:    Constitutional: No acute distress  Neuro: Awake alert, oriented  HEENT: anicteric sclerae  Neck: supple, no JVD  CV: regular rate, regular rhythm  Pulm/chest: lung sounds CTA and equal bilaterally, no accessory muscle use noted  Abd: soft, nontender, nondistended +bowel sounds. No rigidity, rebound tenderness, or guarding  Ext: no Cyanosis, clubbing or edema  Skin: warm, no jaundice   Psych: calm, cooperative      LABS:             12.0   7.38  )-----------( 255      ( 08-07 @ 06:34 )             36.9                11.3   8.48  )-----------( 225      ( 08-06 @ 20:56 )             33.7       PT/INR - ( 06 Aug 2023 20:56 )   PT: 12.3 sec;   INR: 1.11 ratio         PTT - ( 06 Aug 2023 20:56 )  PTT:25.4 sec  08-07    140  |  108  |  8.9  ----------------------------<  248<H>  3.9   |  19.0<L>  |  0.51    Ca    8.5      07 Aug 2023 06:34  Phos  2.9     08-07  Mg     1.8     08-07    TPro  6.9  /  Alb  3.6  /  TBili  0.5  /  DBili  x   /  AST  18  /  ALT  26  /  AlkPhos  80  08-07    LIVER FUNCTIONS - ( 07 Aug 2023 06:34 )  Alb: 3.6 g/dL / Pro: 6.9 g/dL / ALK PHOS: 80 U/L / ALT: 26 U/L / AST: 18 U/L / GGT: x                 Urinalysis Basic - ( 07 Aug 2023 06:34 )    Color: x / Appearance: x / SG: x / pH: x  Gluc: 248 mg/dL / Ketone: x  / Bili: x / Urobili: x   Blood: x / Protein: x / Nitrite: x   Leuk Esterase: x / RBC: x / WBC x   Sq Epi: x / Non Sq Epi: x / Bacteria: x            INTERPRETATION:  CLINICAL INFORMATION: Dilated CBD    COMPARISON: Abdominal ultrasound dated 08/06/2023    CONTRAST/COMPLICATIONS:  IV Contrast: NONE  Oral Contrast: NONE  Complications: None reported at time of study completion    PROCEDURE:  MRI/MRCP was performed. Radial and 3D MRCP sequences were obtained.      FINDINGS:  Limited study without intravenous contrast. Many of the sequences are also partially limited due to respiratory motion artifact.    LOWER CHEST: Within normal limits.    LIVER: Steatosis. Hepatomegaly with relative enlargement of the right hepatic lobe/Riedel's lobe.  BILE DUCTS: Localized mild to moderate intrahepatic ductal dilatation at the junctions of segments 2 and 4. Difficult to localize 14 mm T1 hyperintense nodule at the site of biliary obstruction, possibly an intraductal stone or proteinaceous material within a dilated ectatic duct versus parenchymal or vascular in location. Mild extrahepatic dilatation without definite cause identified, upper common bile duct measuring 8 mm. Tapered appearance of the distal duct above the ampulla.  GALLBLADDER: Within normal limits.  SPLEEN: Within normal limits.  PANCREAS: Within normal limits.  ADRENALS: Within normal limits.  KIDNEYS/URETERS: Within normal limits.    VISUALIZED PORTIONS:  BOWEL: Within normal limits.  PERITONEUM: No ascites.  VESSELS: Within normal limits.  RETROPERITONEUM/LYMPH NODES: No lymphadenopathy.  ABDOMINAL WALL: To fat-containing umbilical hernia.  BONES: Within normal limits.    IMPRESSION:  1.  Localized left hepatic lobe intraductal dilatation. Difficult to localize T1 hyperintense nodule at the site of biliary obstruction, possibly representing an intraductal stone/proteinaceous material (such as in the setting of biliary stricture, recurrent pyogenic cholangitis, or Caroli's disease) versus portal venous thrombus or adjacent parenchymal lesion.  2.  Nonspecific extrahepatic ductal dilatation without specific cause identified. Consider ampullary stenosis, occult ampullary lesion, or stricture.  3.  Hepatic steatosis and hepatomegaly.    Recommend repeat MRI/MRCP with and without contrast.    US abdomen (08/06/2023):     COMPARISON: None available.    TECHNIQUE: Sonography of the right upper quadrant.    FINDINGS:  Liver: Diffuse fatty infiltration.  Bile ducts: Mild intrahepatic biliary ductal dilatation. Common bile duct measures 8 mm.  Gallbladder: Within normal limits.  Pancreas: Visualized portions are within normal limits.  Right kidney: 10.4 cm. No hydronephrosis.  Ascites: None.  IVC: Visualized portions are within normal limits.    IMPRESSION:  Hepatic steatosis.    Mild intrahepatic biliary ductal dilatation and mild common bile duct dilatation without identifiable cause. Further evaluation with contrast-enhanced CT scan of the abdomen recommended.

## 2023-08-07 NOTE — H&P ADULT - NSICDXPASTMEDICALHX_GEN_ALL_CORE_FT
PAST MEDICAL HISTORY:  Diabetes     GERD (gastroesophageal reflux disease)     HLD (hyperlipidemia)     HTN (hypertension)

## 2023-08-08 LAB
GLUCOSE BLDC GLUCOMTR-MCNC: 186 MG/DL — HIGH (ref 70–99)
GLUCOSE BLDC GLUCOMTR-MCNC: 188 MG/DL — HIGH (ref 70–99)
GLUCOSE BLDC GLUCOMTR-MCNC: 190 MG/DL — HIGH (ref 70–99)
GLUCOSE BLDC GLUCOMTR-MCNC: 202 MG/DL — HIGH (ref 70–99)
GLUCOSE BLDC GLUCOMTR-MCNC: 245 MG/DL — HIGH (ref 70–99)
HCV AB S/CO SERPL IA: 0.09 S/CO — SIGNIFICANT CHANGE UP (ref 0–0.99)
HCV AB SERPL-IMP: SIGNIFICANT CHANGE UP

## 2023-08-08 PROCEDURE — 99232 SBSQ HOSP IP/OBS MODERATE 35: CPT

## 2023-08-08 RX ORDER — LISINOPRIL 2.5 MG/1
5 TABLET ORAL DAILY
Refills: 0 | Status: DISCONTINUED | OUTPATIENT
Start: 2023-08-09 | End: 2023-08-09

## 2023-08-08 RX ADMIN — Medication 2: at 23:45

## 2023-08-08 RX ADMIN — INSULIN GLARGINE 5 UNIT(S): 100 INJECTION, SOLUTION SUBCUTANEOUS at 23:43

## 2023-08-08 RX ADMIN — Medication 2: at 12:47

## 2023-08-08 RX ADMIN — SODIUM CHLORIDE 3 MILLILITER(S): 9 INJECTION INTRAMUSCULAR; INTRAVENOUS; SUBCUTANEOUS at 13:57

## 2023-08-08 RX ADMIN — FAMOTIDINE 100 MILLIGRAM(S): 10 INJECTION INTRAVENOUS at 12:47

## 2023-08-08 RX ADMIN — Medication 650 MILLIGRAM(S): at 23:40

## 2023-08-08 RX ADMIN — Medication 2: at 06:07

## 2023-08-08 RX ADMIN — Medication 4: at 18:40

## 2023-08-08 RX ADMIN — SODIUM CHLORIDE 3 MILLILITER(S): 9 INJECTION INTRAMUSCULAR; INTRAVENOUS; SUBCUTANEOUS at 23:32

## 2023-08-08 RX ADMIN — Medication 650 MILLIGRAM(S): at 14:41

## 2023-08-08 RX ADMIN — Medication 650 MILLIGRAM(S): at 15:41

## 2023-08-08 NOTE — PROGRESS NOTE ADULT - SUBJECTIVE AND OBJECTIVE BOX
Patient is a 63y old  Female who presents with a chief complaint of Choledocholithiasis (08 Aug 2023 10:24)      Patient seen and examined at bedside with  Sulaiman   pt is reporting no abd pain ,no nausea   GI follow up appreciated     MR of abd w/wo contrast P     No overnight events reported.     ALLERGIES:  No Known Allergies    MEDICATIONS  (STANDING):  dextrose 5%. 1000 milliLiter(s) (50 mL/Hr) IV Continuous <Continuous>  dextrose 5%. 1000 milliLiter(s) (100 mL/Hr) IV Continuous <Continuous>  dextrose 50% Injectable 25 Gram(s) IV Push once  dextrose 50% Injectable 12.5 Gram(s) IV Push once  dextrose 50% Injectable 25 Gram(s) IV Push once  famotidine  IVPB 20 milliGRAM(s) IV Intermittent daily  glucagon  Injectable 1 milliGRAM(s) IntraMuscular once  insulin glargine Injectable (LANTUS) 5 Unit(s) SubCutaneous at bedtime  insulin lispro (ADMELOG) corrective regimen sliding scale   SubCutaneous every 6 hours  sodium chloride 0.9% lock flush 3 milliLiter(s) IV Push every 8 hours  sodium chloride 0.9%. 1000 milliLiter(s) (100 mL/Hr) IV Continuous <Continuous>    MEDICATIONS  (PRN):  acetaminophen     Tablet .. 650 milliGRAM(s) Oral every 6 hours PRN Temp greater or equal to 38C (100.4F), Mild Pain (1 - 3)  aluminum hydroxide/magnesium hydroxide/simethicone Suspension 30 milliLiter(s) Oral every 4 hours PRN Dyspepsia  dextrose Oral Gel 15 Gram(s) Oral once PRN Blood Glucose LESS THAN 70 milliGRAM(s)/deciliter  melatonin 3 milliGRAM(s) Oral at bedtime PRN Insomnia  morphine  - Injectable 2 milliGRAM(s) IV Push every 4 hours PRN Moderate Pain (4 - 6)  ondansetron Injectable 4 milliGRAM(s) IV Push every 8 hours PRN Nausea and/or Vomiting    Vital Signs Last 24 Hrs  T(F): 98.3 (08 Aug 2023 09:45), Max: 98.3 (08 Aug 2023 09:45)  HR: 65 (08 Aug 2023 09:45) (64 - 79)  BP: 121/71 (08 Aug 2023 09:45) (120/73 - 154/83)  RR: 18 (08 Aug 2023 09:45) (17 - 18)  SpO2: 94% (08 Aug 2023 09:45) (92% - 97%)  I&O's Summary    07 Aug 2023 07:01  -  08 Aug 2023 07:00  --------------------------------------------------------  IN: 500 mL / OUT: 0 mL / NET: 500 mL        PHYSICAL EXAM:  General: awake alert ,no distress  Lungs: CTA bilateral   Cardio: RRR, S1/S2, No murmur  Abdomen: Soft, Nontender, Nondistended; Bowel sounds present  Extremities: No calf tenderness, No pitting edema  Skin : normal color     LABS:                        12.0   7.38  )-----------( 255      ( 07 Aug 2023 06:34 )             36.9     08-07    140  |  108  |  8.9  ----------------------------<  248  3.9   |  19.0  |  0.51    Ca    8.5      07 Aug 2023 06:34  Phos  2.9     08-07  Mg     1.8     08-07    TPro  6.9  /  Alb  3.6  /  TBili  0.5  /  DBili  x   /  AST  18  /  ALT  26  /  AlkPhos  80  08-07          PT/INR - ( 06 Aug 2023 20:56 )   PT: 12.3 sec;   INR: 1.11 ratio         PTT - ( 06 Aug 2023 20:56 )  PTT:25.4 sec                        POCT Blood Glucose.: 188 mg/dL (08 Aug 2023 12:38)  POCT Blood Glucose.: 202 mg/dL (08 Aug 2023 08:35)  POCT Blood Glucose.: 190 mg/dL (08 Aug 2023 06:05)  POCT Blood Glucose.: 223 mg/dL (07 Aug 2023 23:52)  POCT Blood Glucose.: 191 mg/dL (07 Aug 2023 19:16)      Urinalysis Basic - ( 07 Aug 2023 06:34 )    Color: x / Appearance: x / SG: x / pH: x  Gluc: 248 mg/dL / Ketone: x  / Bili: x / Urobili: x   Blood: x / Protein: x / Nitrite: x   Leuk Esterase: x / RBC: x / WBC x   Sq Epi: x / Non Sq Epi: x / Bacteria: x          RADIOLOGY & ADDITIONAL TESTS:

## 2023-08-08 NOTE — PROGRESS NOTE ADULT - SUBJECTIVE AND OBJECTIVE BOX
Chief Complaint: This is a 63y old woman patient being seen in follow-up consultation for abnormal radiology finding; US  abdomen  revealed mild dilation of the bile duct.  MRCP revealed intrahepatic ductal dilation. Mildly elevated liver enzymes now normalized. Normal TB.     Interval HPI / 24H events:  Patient seen and evaluated at bedside, reporting no complaints. Tolerating diet. Awaiting MRCP for further evaluation        Review of Systems:  . Constitutional: No fever, chills  . HEENT: Negative  · Respiratory and Thorax: No shortness of breath, no cough  · Cardiovascular: No chest pain, palpitation, no dizziness  · Gastrointestinal: see above  · Genitourinary: No hematuria  · Musculoskeletal: Negative  · Neurological: negative  · Psychiatric: no agitation, no anxiety      PAST MEDICAL/SURGICAL HISTORY:  HTN (hypertension)    HLD (hyperlipidemia)    Diabetes    GERD (gastroesophageal reflux disease)    No significant past surgical history      MEDICATIONS  (STANDING):  dextrose 5%. 1000 milliLiter(s) (50 mL/Hr) IV Continuous <Continuous>  dextrose 5%. 1000 milliLiter(s) (100 mL/Hr) IV Continuous <Continuous>  dextrose 50% Injectable 25 Gram(s) IV Push once  dextrose 50% Injectable 12.5 Gram(s) IV Push once  dextrose 50% Injectable 25 Gram(s) IV Push once  famotidine  IVPB 20 milliGRAM(s) IV Intermittent daily  glucagon  Injectable 1 milliGRAM(s) IntraMuscular once  insulin glargine Injectable (LANTUS) 5 Unit(s) SubCutaneous at bedtime  insulin lispro (ADMELOG) corrective regimen sliding scale   SubCutaneous every 6 hours  sodium chloride 0.9% lock flush 3 milliLiter(s) IV Push every 8 hours  sodium chloride 0.9%. 1000 milliLiter(s) (100 mL/Hr) IV Continuous <Continuous>    MEDICATIONS  (PRN):  acetaminophen     Tablet .. 650 milliGRAM(s) Oral every 6 hours PRN Temp greater or equal to 38C (100.4F), Mild Pain (1 - 3)  aluminum hydroxide/magnesium hydroxide/simethicone Suspension 30 milliLiter(s) Oral every 4 hours PRN Dyspepsia  dextrose Oral Gel 15 Gram(s) Oral once PRN Blood Glucose LESS THAN 70 milliGRAM(s)/deciliter  melatonin 3 milliGRAM(s) Oral at bedtime PRN Insomnia  morphine  - Injectable 2 milliGRAM(s) IV Push every 4 hours PRN Moderate Pain (4 - 6)  ondansetron Injectable 4 milliGRAM(s) IV Push every 8 hours PRN Nausea and/or Vomiting    No Known Allergies    T(C): 36.7 (08-08-23 @ 04:11), Max: 36.7 (08-07-23 @ 16:19)  HR: 64 (08-08-23 @ 04:11) (64 - 79)  BP: 120/73 (08-08-23 @ 04:11) (120/73 - 154/83)  RR: 17 (08-08-23 @ 04:11) (17 - 18)  SpO2: 92% (08-08-23 @ 04:11) (92% - 97%)    I&O's Summary    07 Aug 2023 07:01  -  08 Aug 2023 07:00  --------------------------------------------------------  IN: 500 mL / OUT: 0 mL / NET: 500 mL      PHYSICAL EXAM:    Constitutional: No acute distress  Neuro: Awake alert, oriented  HEENT: anicteric sclerae  Neck: supple, no JVD  CV: regular rate, regular rhythm  Pulm/chest: lung sounds CTA and equal bilaterally, no accessory muscle use noted  Abd: soft, nontender, nondistended +bowel sounds. No rigidity, rebound tenderness, or guarding  Ext: no Cyanosis, clubbing or edema  Skin: warm, no jaundice   Psych: calm, cooperative      LABS:               12.0   7.38  )-----------( 255      ( 08-07 @ 06:34 )             36.9                11.3   8.48  )-----------( 225      ( 08-06 @ 20:56 )             33.7       08-07    140  |  108  |  8.9  ----------------------------<  248<H>  3.9   |  19.0<L>  |  0.51    Ca    8.5      07 Aug 2023 06:34  Phos  2.9     08-07  Mg     1.8     08-07    TPro  6.9  /  Alb  3.6  /  TBili  0.5  /  DBili  x   /  AST  18  /  ALT  26  /  AlkPhos  80  08-07    LIVER FUNCTIONS - ( 07 Aug 2023 06:34 )  Alb: 3.6 g/dL / Pro: 6.9 g/dL / ALK PHOS: 80 U/L / ALT: 26 U/L / AST: 18 U/L / GGT: x               PT/INR - ( 06 Aug 2023 20:56 )   PT: 12.3 sec;   INR: 1.11 ratio         PTT - ( 06 Aug 2023 20:56 )  PTT:25.4 sec

## 2023-08-08 NOTE — PROGRESS NOTE ADULT - ASSESSMENT
62 y/o female with left hepatic lobe intraductal dilation, hx of DM-2, HTN, HLD, GERD    1-Left hepatic lobe intra-ductal dilation:  repeat MR oif abd w/wo contrast P   GI follow up re EUS / ERCP   cont NPO   monitor BG while NPO   trend LFTS     2-DM-2:  --Hold oral agents  -Q 6 hour accu checks  -SS coverage, basal insulin as needed     3-HTN:  -Resume lisinopril low dose in am   BP si controlled     HLD:      GERD:  -IV H2 while NPO    Case discussed with Patient using , Nursing

## 2023-08-08 NOTE — PROGRESS NOTE ADULT - ASSESSMENT
64 y/o female with hx of DM-2, HTN, HLD, GERD presented to ED after transferred from [facility] [place] after radiology finding of localized left hepatic intraductal dilation on MRCP. RUQ sono with mild CBD dilation, measuring 8 mm, MRCP w/ localized left hepatic intraductal dilation concerning for neoplasm, stone, or stricture. Normal Liver enzymes.      64 y/o female with hx of DM-2, HTN, HLD, GERD [place] [facility] [place] finding of localized left hepatic intraductal dilation on MRCP. RUQ sono with mild CBD dilation, measuring 8 mm, MRCP w/ localized left hepatic intraductal dilation concerning for neoplasm, stone, or stricture. Normal Liver enzymes.      64 y/o female with hx of DM-2, HTN, HLD, GERD [place] [facility] [place] finding of localized left hepatic intraductal dilation on MRCP. RUQ sono with mild CBD dilation, measuring 8 mm, MRCP w/ localized left hepatic intraductal dilation concerning for neoplasm, stone, or stricture. Normal Liver enzymes.

## 2023-08-08 NOTE — CONSULT NOTE ADULT - ASSESSMENT
ASSESSMENT: Patient is a 63yf with pmhx of DM, GERD incidentally found to have CBD dilation on MRCP & RUQ US    PLAN:    - F/U MRCP with IV contrast  - Tumor markers (CEA, CA 19-9) ordered  - Will follow along  - Plan discussed with Attending, Dr. Ansari

## 2023-08-08 NOTE — CONSULT NOTE ADULT - SUBJECTIVE AND OBJECTIVE BOX
SURGICAL ONCOLOGY CONSULT NOTE  --------------------------------------------------------------------------------------------    HPI:   Patient is a 63y old Female who was incidentally found to have CBD dilation c/f choledocholithiasis and MRCP with localized hepatic intraductal dilation, transferred here for advanced GI. Patient states 3d ago she started experiencing upper abdominal pain and N/V, diarrhea which prompted her to come to [facility] [place] where the above was found. Since admission, her symptoms have improved. No acholic stools, dark urine, jaundice, scleral icterus. She denies any h/o trauma, smoking, drinking, hepatitis, family or personal h/o cancer. She says she had a similar pain ~1 yr ago and was told she had a fatty liver.     Admission HPI:  62 y/o female with hx of DM-2, HTN, HLD, GERD who went to [facility] [place] earlier today c/o RUQ pain, Nausea, followed by NBNB vomits. Describes symptoms as sudden onset with no recent illnesses, travel, or sick contacts. She denies any changes in her medications. At [facility] noted to have stable vitals, RUQ pain on exam, elevated AP with mild elevation of ALT, normal AST and normal bili. RUQ sono with mild CBD dilation, MRCP w/ localized left hepatic intraductal dilation concerning for neoplasm, stone, or stricture. Case was discussed with GI on call who agreed to have Patient transferred here for ERCP. At this time denies N/V, pain, SOB, F/C. Vitals stable. Seen with .  (07 Aug 2023 01:15)        PAST MEDICAL & SURGICAL HISTORY:  HTN (hypertension)  HLD (hyperlipidemia)  Diabetes  GERD (gastroesophageal reflux disease)      No significant past surgical history        FAMILY HISTORY:  No pertinent family history in first degree relatives    [] Family history not pertinent as reviewed with the patient and family    SOCIAL HISTORY:    Denies toxic habits x3    ALLERGIES: No Known Allergies      CURRENT MEDICATIONS  MEDICATIONS (STANDING): dextrose 5%. 1000 milliLiter(s) IV Continuous <Continuous>  dextrose 5%. 1000 milliLiter(s) IV Continuous <Continuous>  dextrose 50% Injectable 25 Gram(s) IV Push once  dextrose 50% Injectable 12.5 Gram(s) IV Push once  dextrose 50% Injectable 25 Gram(s) IV Push once  famotidine  IVPB 20 milliGRAM(s) IV Intermittent daily  glucagon  Injectable 1 milliGRAM(s) IntraMuscular once  insulin glargine Injectable (LANTUS) 5 Unit(s) SubCutaneous at bedtime  insulin lispro (ADMELOG) corrective regimen sliding scale   SubCutaneous every 6 hours  sodium chloride 0.9% lock flush 3 milliLiter(s) IV Push every 8 hours    MEDICATIONS (PRN):acetaminophen     Tablet .. 650 milliGRAM(s) Oral every 6 hours PRN Temp greater or equal to 38C (100.4F), Mild Pain (1 - 3)  aluminum hydroxide/magnesium hydroxide/simethicone Suspension 30 milliLiter(s) Oral every 4 hours PRN Dyspepsia  dextrose Oral Gel 15 Gram(s) Oral once PRN Blood Glucose LESS THAN 70 milliGRAM(s)/deciliter  melatonin 3 milliGRAM(s) Oral at bedtime PRN Insomnia  morphine  - Injectable 2 milliGRAM(s) IV Push every 4 hours PRN Moderate Pain (4 - 6)  ondansetron Injectable 4 milliGRAM(s) IV Push every 8 hours PRN Nausea and/or Vomiting    --------------------------------------------------------------------------------------------    Vitals:   T(C): 36.6 (08-08-23 @ 16:47), Max: 36.8 (08-08-23 @ 09:45)  HR: 65 (08-08-23 @ 16:47) (64 - 66)  BP: 133/80 (08-08-23 @ 16:47) (120/73 - 144/86)  RR: 18 (08-08-23 @ 16:47) (17 - 18)  SpO2: 95% (08-08-23 @ 16:47) (92% - 96%)  CAPILLARY BLOOD GLUCOSE      POCT Blood Glucose.: 245 mg/dL (08 Aug 2023 17:52)  POCT Blood Glucose.: 188 mg/dL (08 Aug 2023 12:38)  POCT Blood Glucose.: 202 mg/dL (08 Aug 2023 08:35)  POCT Blood Glucose.: 190 mg/dL (08 Aug 2023 06:05)  POCT Blood Glucose.: 223 mg/dL (07 Aug 2023 23:52)  POCT Blood Glucose.: 191 mg/dL (07 Aug 2023 19:16)      08-07 @ 07:01  -  08-08 @ 07:00  --------------------------------------------------------  IN:    sodium chloride 0.9%: 500 mL  Total IN: 500 mL    OUT:  Total OUT: 0 mL    Total NET: 500 mL        PHYSICAL EXAM:   General: NAD, Lying in bed comfortably  Neuro: A+Ox3  Resp: Good effort, no increased work of breathing  GI/Abd: Soft, nondistended, minimal tenderness in epigastrium, no rebound/guarding, no masses palpated  Vascular: All 4 extremities warm.  Skin: Intact, no breakdown    --------------------------------------------------------------------------------------------    LABS  CBC (08-07 @ 06:34)                              12.0                           7.38    )----------------(  255        --    % Neutrophils, --    % Lymphocytes, ANC: --                                  36.9      BMP (08-07 @ 06:34)             140     |  108     |  8.9   		Ca++ --      Ca 8.5                ---------------------------------( 248<H>		Mg 1.8                3.9     |  19.0<L>  |  0.51  			Ph 2.9       LFTs (08-07 @ 06:34)      TPro 6.9 / Alb 3.6 / TBili 0.5 / DBili -- / AST 18 / ALT 26 / AlkPhos 80    --------------------------------------------------------------------------------------------    MICROBIOLOGY  Urinalysis (08-07 @ 06:34):     Color:  / Appearance:  / SG:  / pH:  / Gluc: 248<H> / Ketones:  / Bili:  / Urobili:  / Protein : / Nitrites:  / Leuk.Est:  / RBC:  / WBC:  / Sq Epi:  / Non Sq Epi:  / Bacteria        -------------------------------------------------------------------------------------------- SURGICAL ONCOLOGY CONSULT NOTE  --------------------------------------------------------------------------------------------    HPI:   Patient is a 63y old Female who was incidentally found to have CBD dilation c/f choledocholithiasis and MRCP with localized hepatic intraductal dilation, transferred here for advanced GI. Patient states 3d ago she started experiencing upper abdominal pain and N/V, diarrhea which prompted her to come to [facility] [place] where the above was found. Since admission, her symptoms have improved. No acholic stools, dark urine, jaundice, scleral icterus. She denies any h/o trauma, smoking, drinking, hepatitis, family or personal h/o cancer. She says she had a similar pain ~1 yr ago and was told she had a fatty liver.     Admission HPI:  62 y/o female with hx of DM-2, HTN, HLD, GERD who went to [facility] [place] earlier today c/o RUQ pain, Nausea, followed by NBNB vomits. Describes symptoms as sudden onset with no recent illnesses, travel, or sick contacts. She denies any changes in her medications. At [facility] [place] noted to have stable vitals, RUQ pain on exam, elevated AP with mild elevation of ALT, normal AST and normal bili. RUQ sono with mild CBD dilation, MRCP w/ localized left hepatic intraductal dilation concerning for neoplasm, stone, or stricture. Case was discussed with GI on call who agreed to have Patient transferred here for ERCP. At this time denies N/V, pain, SOB, F/C. Vitals stable. Seen with .  (07 Aug 2023 01:15)        PAST MEDICAL & SURGICAL HISTORY:  HTN (hypertension)  HLD (hyperlipidemia)  Diabetes  GERD (gastroesophageal reflux disease)      No significant past surgical history        FAMILY HISTORY:  No pertinent family history in first degree relatives    [] Family history not pertinent as reviewed with the patient and family    SOCIAL HISTORY:    Denies toxic habits x3    ALLERGIES: No Known Allergies      CURRENT MEDICATIONS  MEDICATIONS (STANDING): dextrose 5%. 1000 milliLiter(s) IV Continuous <Continuous>  dextrose 5%. 1000 milliLiter(s) IV Continuous <Continuous>  dextrose 50% Injectable 25 Gram(s) IV Push once  dextrose 50% Injectable 12.5 Gram(s) IV Push once  dextrose 50% Injectable 25 Gram(s) IV Push once  famotidine  IVPB 20 milliGRAM(s) IV Intermittent daily  glucagon  Injectable 1 milliGRAM(s) IntraMuscular once  insulin glargine Injectable (LANTUS) 5 Unit(s) SubCutaneous at bedtime  insulin lispro (ADMELOG) corrective regimen sliding scale   SubCutaneous every 6 hours  sodium chloride 0.9% lock flush 3 milliLiter(s) IV Push every 8 hours    MEDICATIONS (PRN):acetaminophen     Tablet .. 650 milliGRAM(s) Oral every 6 hours PRN Temp greater or equal to 38C (100.4F), Mild Pain (1 - 3)  aluminum hydroxide/magnesium hydroxide/simethicone Suspension 30 milliLiter(s) Oral every 4 hours PRN Dyspepsia  dextrose Oral Gel 15 Gram(s) Oral once PRN Blood Glucose LESS THAN 70 milliGRAM(s)/deciliter  melatonin 3 milliGRAM(s) Oral at bedtime PRN Insomnia  morphine  - Injectable 2 milliGRAM(s) IV Push every 4 hours PRN Moderate Pain (4 - 6)  ondansetron Injectable 4 milliGRAM(s) IV Push every 8 hours PRN Nausea and/or Vomiting    --------------------------------------------------------------------------------------------    Vitals:   T(C): 36.6 (08-08-23 @ 16:47), Max: 36.8 (08-08-23 @ 09:45)  HR: 65 (08-08-23 @ 16:47) (64 - 66)  BP: 133/80 (08-08-23 @ 16:47) (120/73 - 144/86)  RR: 18 (08-08-23 @ 16:47) (17 - 18)  SpO2: 95% (08-08-23 @ 16:47) (92% - 96%)  CAPILLARY BLOOD GLUCOSE      POCT Blood Glucose.: 245 mg/dL (08 Aug 2023 17:52)  POCT Blood Glucose.: 188 mg/dL (08 Aug 2023 12:38)  POCT Blood Glucose.: 202 mg/dL (08 Aug 2023 08:35)  POCT Blood Glucose.: 190 mg/dL (08 Aug 2023 06:05)  POCT Blood Glucose.: 223 mg/dL (07 Aug 2023 23:52)  POCT Blood Glucose.: 191 mg/dL (07 Aug 2023 19:16)      08-07 @ 07:01  -  08-08 @ 07:00  --------------------------------------------------------  IN:    sodium chloride 0.9%: 500 mL  Total IN: 500 mL    OUT:  Total OUT: 0 mL    Total NET: 500 mL        PHYSICAL EXAM:   General: NAD, Lying in bed comfortably  Neuro: A+Ox3  Resp: Good effort, no increased work of breathing  GI/Abd: Soft, nondistended, minimal tenderness in epigastrium, no rebound/guarding, no masses palpated  Vascular: All 4 extremities warm.  Skin: Intact, no breakdown    --------------------------------------------------------------------------------------------    LABS  CBC (08-07 @ 06:34)                              12.0                           7.38    )----------------(  255        --    % Neutrophils, --    % Lymphocytes, ANC: --                                  36.9      BMP (08-07 @ 06:34)             140     |  108     |  8.9   		Ca++ --      Ca 8.5                ---------------------------------( 248<H>		Mg 1.8                3.9     |  19.0<L>  |  0.51  			Ph 2.9       LFTs (08-07 @ 06:34)      TPro 6.9 / Alb 3.6 / TBili 0.5 / DBili -- / AST 18 / ALT 26 / AlkPhos 80    --------------------------------------------------------------------------------------------    MICROBIOLOGY  Urinalysis (08-07 @ 06:34):     Color:  / Appearance:  / SG:  / pH:  / Gluc: 248<H> / Ketones:  / Bili:  / Urobili:  / Protein : / Nitrites:  / Leuk.Est:  / RBC:  / WBC:  / Sq Epi:  / Non Sq Epi:  / Bacteria        -------------------------------------------------------------------------------------------- SURGICAL ONCOLOGY CONSULT NOTE  --------------------------------------------------------------------------------------------    HPI:   Patient is a 63y old Female who was incidentally found to have CBD dilation c/f choledocholithiasis and MRCP with localized hepatic intraductal dilation, transferred here for advanced GI. Patient states 3d ago she started experiencing upper abdominal pain and N/V, diarrhea which prompted her to come to [facility] [place] where the above was found. Since admission, her symptoms have improved. No acholic stools, dark urine, jaundice, scleral icterus. She denies any h/o trauma, smoking, drinking, hepatitis, family or personal h/o cancer. She says she had a similar pain ~1 yr ago and was told she had a fatty liver.     Admission HPI:  64 y/o female with hx of DM-2, HTN, HLD, GERD who went to [facility] [place] earlier today c/o RUQ pain, Nausea, followed by NBNB vomits. Describes symptoms as sudden onset with no recent illnesses, travel, or sick contacts. She denies any changes in her medications. At [facility] [place] noted to have stable vitals, RUQ pain on exam, elevated AP with mild elevation of ALT, normal AST and normal bili. RUQ sono with mild CBD dilation, MRCP w/ localized left hepatic intraductal dilation concerning for neoplasm, stone, or stricture. Case was discussed with GI on call who agreed to have Patient transferred here for ERCP. At this time denies N/V, pain, SOB, F/C. Vitals stable. Seen with .  (07 Aug 2023 01:15)        PAST MEDICAL & SURGICAL HISTORY:  HTN (hypertension)  HLD (hyperlipidemia)  Diabetes  GERD (gastroesophageal reflux disease)      No significant past surgical history        FAMILY HISTORY:  No pertinent family history in first degree relatives    [] Family history not pertinent as reviewed with the patient and family    SOCIAL HISTORY:    Denies toxic habits x3    ALLERGIES: No Known Allergies      CURRENT MEDICATIONS  MEDICATIONS (STANDING): dextrose 5%. 1000 milliLiter(s) IV Continuous <Continuous>  dextrose 5%. 1000 milliLiter(s) IV Continuous <Continuous>  dextrose 50% Injectable 25 Gram(s) IV Push once  dextrose 50% Injectable 12.5 Gram(s) IV Push once  dextrose 50% Injectable 25 Gram(s) IV Push once  famotidine  IVPB 20 milliGRAM(s) IV Intermittent daily  glucagon  Injectable 1 milliGRAM(s) IntraMuscular once  insulin glargine Injectable (LANTUS) 5 Unit(s) SubCutaneous at bedtime  insulin lispro (ADMELOG) corrective regimen sliding scale   SubCutaneous every 6 hours  sodium chloride 0.9% lock flush 3 milliLiter(s) IV Push every 8 hours    MEDICATIONS (PRN):acetaminophen     Tablet .. 650 milliGRAM(s) Oral every 6 hours PRN Temp greater or equal to 38C (100.4F), Mild Pain (1 - 3)  aluminum hydroxide/magnesium hydroxide/simethicone Suspension 30 milliLiter(s) Oral every 4 hours PRN Dyspepsia  dextrose Oral Gel 15 Gram(s) Oral once PRN Blood Glucose LESS THAN 70 milliGRAM(s)/deciliter  melatonin 3 milliGRAM(s) Oral at bedtime PRN Insomnia  morphine  - Injectable 2 milliGRAM(s) IV Push every 4 hours PRN Moderate Pain (4 - 6)  ondansetron Injectable 4 milliGRAM(s) IV Push every 8 hours PRN Nausea and/or Vomiting    --------------------------------------------------------------------------------------------    Vitals:   T(C): 36.6 (08-08-23 @ 16:47), Max: 36.8 (08-08-23 @ 09:45)  HR: 65 (08-08-23 @ 16:47) (64 - 66)  BP: 133/80 (08-08-23 @ 16:47) (120/73 - 144/86)  RR: 18 (08-08-23 @ 16:47) (17 - 18)  SpO2: 95% (08-08-23 @ 16:47) (92% - 96%)  CAPILLARY BLOOD GLUCOSE      POCT Blood Glucose.: 245 mg/dL (08 Aug 2023 17:52)  POCT Blood Glucose.: 188 mg/dL (08 Aug 2023 12:38)  POCT Blood Glucose.: 202 mg/dL (08 Aug 2023 08:35)  POCT Blood Glucose.: 190 mg/dL (08 Aug 2023 06:05)  POCT Blood Glucose.: 223 mg/dL (07 Aug 2023 23:52)  POCT Blood Glucose.: 191 mg/dL (07 Aug 2023 19:16)      08-07 @ 07:01  -  08-08 @ 07:00  --------------------------------------------------------  IN:    sodium chloride 0.9%: 500 mL  Total IN: 500 mL    OUT:  Total OUT: 0 mL    Total NET: 500 mL        PHYSICAL EXAM:   General: NAD, Lying in bed comfortably  Neuro: A+Ox3  Resp: Good effort, no increased work of breathing  GI/Abd: Soft, nondistended, minimal tenderness in epigastrium, no rebound/guarding, no masses palpated  Vascular: All 4 extremities warm.  Skin: Intact, no breakdown    --------------------------------------------------------------------------------------------    LABS  CBC (08-07 @ 06:34)                              12.0                           7.38    )----------------(  255        --    % Neutrophils, --    % Lymphocytes, ANC: --                                  36.9      BMP (08-07 @ 06:34)             140     |  108     |  8.9   		Ca++ --      Ca 8.5                ---------------------------------( 248<H>		Mg 1.8                3.9     |  19.0<L>  |  0.51  			Ph 2.9       LFTs (08-07 @ 06:34)      TPro 6.9 / Alb 3.6 / TBili 0.5 / DBili -- / AST 18 / ALT 26 / AlkPhos 80    --------------------------------------------------------------------------------------------    MICROBIOLOGY  Urinalysis (08-07 @ 06:34):     Color:  / Appearance:  / SG:  / pH:  / Gluc: 248<H> / Ketones:  / Bili:  / Urobili:  / Protein : / Nitrites:  / Leuk.Est:  / RBC:  / WBC:  / Sq Epi:  / Non Sq Epi:  / Bacteria        --------------------------------------------------------------------------------------------

## 2023-08-09 LAB
ALBUMIN SERPL ELPH-MCNC: 3.6 G/DL — SIGNIFICANT CHANGE UP (ref 3.3–5.2)
ALP SERPL-CCNC: 75 U/L — SIGNIFICANT CHANGE UP (ref 40–120)
ALT FLD-CCNC: 28 U/L — SIGNIFICANT CHANGE UP
ANION GAP SERPL CALC-SCNC: 15 MMOL/L — SIGNIFICANT CHANGE UP (ref 5–17)
AST SERPL-CCNC: 27 U/L — SIGNIFICANT CHANGE UP
BILIRUB DIRECT SERPL-MCNC: 0.1 MG/DL — SIGNIFICANT CHANGE UP (ref 0–0.3)
BILIRUB INDIRECT FLD-MCNC: 0.2 MG/DL — SIGNIFICANT CHANGE UP (ref 0.2–1)
BILIRUB SERPL-MCNC: 0.3 MG/DL — LOW (ref 0.4–2)
BUN SERPL-MCNC: 10.5 MG/DL — SIGNIFICANT CHANGE UP (ref 8–20)
CALCIUM SERPL-MCNC: 9 MG/DL — SIGNIFICANT CHANGE UP (ref 8.4–10.5)
CANCER AG19-9 SERPL-ACNC: 19 U/ML — SIGNIFICANT CHANGE UP
CEA SERPL-MCNC: 3.3 NG/ML — SIGNIFICANT CHANGE UP (ref 0–3.8)
CHLORIDE SERPL-SCNC: 103 MMOL/L — SIGNIFICANT CHANGE UP (ref 96–108)
CO2 SERPL-SCNC: 21 MMOL/L — LOW (ref 22–29)
CREAT SERPL-MCNC: 0.48 MG/DL — LOW (ref 0.5–1.3)
EGFR: 108 ML/MIN/1.73M2 — SIGNIFICANT CHANGE UP
GLUCOSE BLDC GLUCOMTR-MCNC: 117 MG/DL — HIGH (ref 70–99)
GLUCOSE BLDC GLUCOMTR-MCNC: 170 MG/DL — HIGH (ref 70–99)
GLUCOSE BLDC GLUCOMTR-MCNC: 184 MG/DL — HIGH (ref 70–99)
GLUCOSE BLDC GLUCOMTR-MCNC: 187 MG/DL — HIGH (ref 70–99)
GLUCOSE BLDC GLUCOMTR-MCNC: 218 MG/DL — HIGH (ref 70–99)
GLUCOSE SERPL-MCNC: 183 MG/DL — HIGH (ref 70–99)
HCT VFR BLD CALC: 36.2 % — SIGNIFICANT CHANGE UP (ref 34.5–45)
HGB BLD-MCNC: 12.1 G/DL — SIGNIFICANT CHANGE UP (ref 11.5–15.5)
LIDOCAIN IGE QN: 46 U/L — SIGNIFICANT CHANGE UP (ref 22–51)
MCHC RBC-ENTMCNC: 28.3 PG — SIGNIFICANT CHANGE UP (ref 27–34)
MCHC RBC-ENTMCNC: 33.4 GM/DL — SIGNIFICANT CHANGE UP (ref 32–36)
MCV RBC AUTO: 84.6 FL — SIGNIFICANT CHANGE UP (ref 80–100)
PLATELET # BLD AUTO: 242 K/UL — SIGNIFICANT CHANGE UP (ref 150–400)
POTASSIUM SERPL-MCNC: 3.6 MMOL/L — SIGNIFICANT CHANGE UP (ref 3.5–5.3)
POTASSIUM SERPL-SCNC: 3.6 MMOL/L — SIGNIFICANT CHANGE UP (ref 3.5–5.3)
PROT SERPL-MCNC: 7 G/DL — SIGNIFICANT CHANGE UP (ref 6.6–8.7)
RBC # BLD: 4.28 M/UL — SIGNIFICANT CHANGE UP (ref 3.8–5.2)
RBC # FLD: 12.6 % — SIGNIFICANT CHANGE UP (ref 10.3–14.5)
SODIUM SERPL-SCNC: 139 MMOL/L — SIGNIFICANT CHANGE UP (ref 135–145)
WBC # BLD: 6.7 K/UL — SIGNIFICANT CHANGE UP (ref 3.8–10.5)
WBC # FLD AUTO: 6.7 K/UL — SIGNIFICANT CHANGE UP (ref 3.8–10.5)

## 2023-08-09 PROCEDURE — 99232 SBSQ HOSP IP/OBS MODERATE 35: CPT

## 2023-08-09 PROCEDURE — 74183 MRI ABD W/O CNTR FLWD CNTR: CPT | Mod: 26

## 2023-08-09 RX ORDER — CIPROFLOXACIN LACTATE 400MG/40ML
400 VIAL (ML) INTRAVENOUS EVERY 12 HOURS
Refills: 0 | Status: DISCONTINUED | OUTPATIENT
Start: 2023-08-09 | End: 2023-08-11

## 2023-08-09 RX ORDER — INDOMETHACIN 50 MG
100 CAPSULE ORAL ONCE
Refills: 0 | Status: COMPLETED | OUTPATIENT
Start: 2023-08-09 | End: 2023-08-10

## 2023-08-09 RX ORDER — POTASSIUM CHLORIDE 20 MEQ
20 PACKET (EA) ORAL ONCE
Refills: 0 | Status: COMPLETED | OUTPATIENT
Start: 2023-08-09 | End: 2023-08-09

## 2023-08-09 RX ADMIN — LISINOPRIL 5 MILLIGRAM(S): 2.5 TABLET ORAL at 06:13

## 2023-08-09 RX ADMIN — Medication 650 MILLIGRAM(S): at 00:35

## 2023-08-09 RX ADMIN — Medication 2: at 06:13

## 2023-08-09 RX ADMIN — Medication 2: at 23:37

## 2023-08-09 RX ADMIN — SODIUM CHLORIDE 3 MILLILITER(S): 9 INJECTION INTRAMUSCULAR; INTRAVENOUS; SUBCUTANEOUS at 21:48

## 2023-08-09 RX ADMIN — SODIUM CHLORIDE 3 MILLILITER(S): 9 INJECTION INTRAMUSCULAR; INTRAVENOUS; SUBCUTANEOUS at 07:14

## 2023-08-09 RX ADMIN — SODIUM CHLORIDE 3 MILLILITER(S): 9 INJECTION INTRAMUSCULAR; INTRAVENOUS; SUBCUTANEOUS at 13:25

## 2023-08-09 RX ADMIN — Medication 4: at 12:54

## 2023-08-09 RX ADMIN — Medication 650 MILLIGRAM(S): at 23:36

## 2023-08-09 RX ADMIN — Medication 20 MILLIEQUIVALENT(S): at 12:53

## 2023-08-09 RX ADMIN — FAMOTIDINE 100 MILLIGRAM(S): 10 INJECTION INTRAVENOUS at 12:53

## 2023-08-09 RX ADMIN — INSULIN GLARGINE 5 UNIT(S): 100 INJECTION, SOLUTION SUBCUTANEOUS at 22:03

## 2023-08-09 NOTE — PROGRESS NOTE ADULT - ASSESSMENT
62 y/o female with hx of DM-2, HTN, HLD, GERD presented to ED after transferred from [facility] [place] after radiology finding of localized left hepatic intraductal dilation on MRCP. RUQ sono with mild CBD dilation, measuring 8 mm, MRCP w/ localized left hepatic intraductal dilation concerning for neoplasm, stone, or stricture. Normal Liver enzymes.      62 y/o female with hx of DM-2, HTN, HLD, GERD presented to ED after transferred from [facility] after radiology finding of localized left hepatic intraductal dilation on MRCP. RUQ sono with mild CBD dilation, measuring 8 mm, MRCP w/ localized left hepatic intraductal dilation concerning for neoplasm, stone, or stricture. Normal Liver enzymes.      62 y/o female with hx of DM-2, HTN, HLD, GERD presented to ED after transferred from [facility] [place] after radiology finding of localized left hepatic intraductal dilation on MRCP. RUQ sono with mild CBD dilation, measuring 8 mm, MRCP w/ localized left hepatic intraductal dilation concerning for neoplasm, stone, or stricture. Normal Liver enzymes.

## 2023-08-09 NOTE — PROGRESS NOTE ADULT - ASSESSMENT
Patient is a 63y old Female who was incidentally found to have CBD dilation c/f choledocholithiasis and MRCP with localized hepatic intraductal dilation, transferred here for advanced GI care. Four days ago she started experiencing upper abdominal pain and N/V, diarrhea which prompted her to go to [facility] [place] where the above was found. Since admission, her symptoms have improved. No acholic stools, dark urine, jaundice, scleral icterus. She denies any h/o trauma, smoking, drinking, hepatitis, family or personal h/o cancer. She says she had a similar pain ~1 yr ago and was told she had a fatty liver.  On examination this morning patient states she feels well, had a BM this morning and states it was normal consistency, not acholic.  No pain on abdominal examination, abdomen soft and non distended, no RUQ pain on palpation.  Tolerated last oral intake well last night.    Plan:  - F/U MRCP with IV contrast  - Tumor markers (CEA, CA 19-9) ordered  - Will follow along   Patient is a 63y old Female who was incidentally found to have CBD dilation c/f choledocholithiasis and MRCP with localized hepatic intraductal dilation, transferred here for advanced GI care. Four days ago she started experiencing upper abdominal pain and N/V, diarrhea which prompted [place] [facility] [place]. Since admission, her symptoms have improved. No acholic stools, dark urine, jaundice, scleral icterus. She denies any h/o trauma, smoking, drinking, hepatitis, family or personal h/o cancer. She says she had a similar pain ~1 yr ago and was told she had a fatty liver.  On examination this morning patient states she feels well, had a BM this morning and states it was normal consistency, not acholic.  No pain on abdominal examination, abdomen soft and non distended, no RUQ pain on palpation.  Tolerated last oral intake well last night.    Plan:  - F/U MRCP with IV contrast  - Tumor markers (CEA, CA 19-9) ordered  - Will follow along   Patient is a 63y old Female who was incidentally found to have CBD dilation c/f choledocholithiasis and MRCP with localized hepatic intraductal dilation, transferred here for advanced GI care. Four days ago she started experiencing upper abdominal pain and N/V, diarrhea which prompted her to go to [facility] [place] where the above was found. Since admission, her symptoms have improved. No acholic stools, dark urine, jaundice, scleral icterus. She denies any h/o trauma, smoking, drinking, hepatitis, family or personal h/o cancer. She says she had a similar pain ~1 yr ago and was told she had a fatty liver.  On examination this morning patient states she feels well, had a BM this morning and states it was normal consistency, not acholic.  No pain on abdominal examination, abdomen soft and non distended, no RUQ pain on palpation.  Tolerated last oral intake well last night.    Plan:  - F/U MRCP with IV contrast  - Tumor markers (CEA, CA 19-9) ordered  - Will follow along   Patient is a 63y old Female who was incidentally found to have CBD dilation c/f choledocholithiasis and MRCP with localized hepatic intraductal dilation, transferred here for advanced GI care. Four days ago she started experiencing upper abdominal pain and N/V, diarrhea which prompted her to go to [facility] [place] where the above was found. Since admission, her symptoms have improved. No acholic stools, dark urine, jaundice, scleral icterus. She denies any h/o trauma, smoking, drinking, hepatitis, family or personal h/o cancer. She says she had a similar pain ~1 yr ago and was told she had a fatty liver.  On examination this morning patient states she feels well, had a BM this morning and states it was normal consistency, not acholic.  No pain on abdominal examination, abdomen soft and non distended, no RUQ pain on palpation.  Tolerated last oral intake well last night.    Plan:  - F/U MRCP with IV contrast, contacted MRI, state she will go in the afternoon  - Tumor markers (CEA, CA 19-9) ordered  - Will follow along   Patient is a 63y old Female who was incidentally found to have CBD dilation c/f choledocholithiasis and MRCP with localized hepatic intraductal dilation, transferred here for advanced GI care. Four days ago she started experiencing upper abdominal pain and N/V, diarrhea which prompted her to go to [facility] [place] where the above was found. Since admission, her symptoms have improved. No acholic stools, dark urine, jaundice, scleral icterus. She denies any h/o trauma, smoking, drinking, hepatitis, family or personal h/o cancer. She says she had a similar pain ~1 yr ago and was told she had a fatty liver.  On examination this morning patient states she feels well, had a BM this morning and states it was normal consistency, not acholic.  No pain on abdominal examination, abdomen soft and non distended, no RUQ pain on palpation.  Tolerated last oral intake well last night.    Plan:  - F/U MRCP with IV contrast, contacted MRI, state she will go in the afternoon  - Tumor markers (CEA, CA 19-9) ordered  - Will follow along   Patient is a 63y old Female who was incidentally found to have CBD dilation c/f choledocholithiasis and MRCP with localized hepatic intraductal dilation, transferred here for advanced GI care. Four days ago she started experiencing upper abdominal pain and N/V, diarrhea which prompted her to go to [facility] [place] where the above was found. Since admission, her symptoms have improved. No acholic stools, dark urine, jaundice, scleral icterus. She denies any h/o trauma, smoking, drinking, hepatitis, family or personal h/o cancer. She says she had a similar pain ~1 yr ago and was told she had a fatty liver.  On examination this morning patient states she feels well, had a BM this morning and states it was normal consistency, not acholic.  No pain on abdominal examination, abdomen soft and non distended, no RUQ pain on palpation.  Tolerated last oral intake well last night.    Plan:  - F/U MRCP with IV contrast, contacted MRI, state she will go in the afternoon  - Tumor markers (CEA, CA 19-9) ordered  - Will follow along

## 2023-08-09 NOTE — PROGRESS NOTE ADULT - SUBJECTIVE AND OBJECTIVE BOX
HPI/OVERNIGHT EVENTS: Patient seen and examined at bedside this AM. No overnight events. No complaints. Denies fever, chills, nausea, vomiting, chest pain, SOB, dizziness, abd pain or any other concerning symptoms.  States she had normal BM this morning.  States pain has improved and states she had no pain after her last meal.    Vital Signs Last 24 Hrs  T(C): 36.7 (09 Aug 2023 04:43), Max: 36.8 (08 Aug 2023 09:45)  T(F): 98.1 (09 Aug 2023 04:43), Max: 98.3 (08 Aug 2023 09:45)  HR: 70 (09 Aug 2023 04:43) (64 - 70)  BP: 131/73 (09 Aug 2023 04:43) (121/71 - 144/82)  BP(mean): --  RR: 18 (09 Aug 2023 04:43) (18 - 18)  SpO2: 95% (09 Aug 2023 04:43) (94% - 97%)    Parameters below as of 09 Aug 2023 04:43  Patient On (Oxygen Delivery Method): room air        I&O's Detail      Constitutional: patient resting comfortably in bed, in no acute distress  HEENT: EOMI, PERRLA, MMM.  Respiratory: Non labored breathing on RA  Cardiovascular: RRR  Gastrointestinal: Abdomen soft, non-tender, non-distended, no rebound tenderness / guarding  Musculoskeletal: No joint pain, swelling or deformity; no limitation of movement  Vascular: Extremities warm and well perfused.     LABS:                        12.1   6.70  )-----------( 242      ( 09 Aug 2023 05:00 )             36.2     08-09    139  |  103  |  10.5  ----------------------------<  183<H>  3.6   |  21.0<L>  |  0.48<L>    Ca    9.0      09 Aug 2023 05:00    TPro  7.0  /  Alb  3.6  /  TBili  0.3<L>  /  DBili  0.1  /  AST  27  /  ALT  28  /  AlkPhos  75  08-09      Urinalysis Basic - ( 09 Aug 2023 05:00 )    Color: x / Appearance: x / SG: x / pH: x  Gluc: 183 mg/dL / Ketone: x  / Bili: x / Urobili: x   Blood: x / Protein: x / Nitrite: x   Leuk Esterase: x / RBC: x / WBC x   Sq Epi: x / Non Sq Epi: x / Bacteria: x        MEDICATIONS  (STANDING):  dextrose 5%. 1000 milliLiter(s) (100 mL/Hr) IV Continuous <Continuous>  dextrose 5%. 1000 milliLiter(s) (50 mL/Hr) IV Continuous <Continuous>  dextrose 50% Injectable 25 Gram(s) IV Push once  dextrose 50% Injectable 12.5 Gram(s) IV Push once  dextrose 50% Injectable 25 Gram(s) IV Push once  famotidine  IVPB 20 milliGRAM(s) IV Intermittent daily  glucagon  Injectable 1 milliGRAM(s) IntraMuscular once  insulin glargine Injectable (LANTUS) 5 Unit(s) SubCutaneous at bedtime  insulin lispro (ADMELOG) corrective regimen sliding scale   SubCutaneous every 6 hours  sodium chloride 0.9% lock flush 3 milliLiter(s) IV Push every 8 hours    MEDICATIONS  (PRN):  acetaminophen     Tablet .. 650 milliGRAM(s) Oral every 6 hours PRN Temp greater or equal to 38C (100.4F), Mild Pain (1 - 3)  aluminum hydroxide/magnesium hydroxide/simethicone Suspension 30 milliLiter(s) Oral every 4 hours PRN Dyspepsia  dextrose Oral Gel 15 Gram(s) Oral once PRN Blood Glucose LESS THAN 70 milliGRAM(s)/deciliter  melatonin 3 milliGRAM(s) Oral at bedtime PRN Insomnia  morphine  - Injectable 2 milliGRAM(s) IV Push every 4 hours PRN Moderate Pain (4 - 6)  ondansetron Injectable 4 milliGRAM(s) IV Push every 8 hours PRN Nausea and/or Vomiting      MICRO:   Cultures     STUDIES:   EKG, CXR, U/S, CT, MRI

## 2023-08-09 NOTE — PROGRESS NOTE ADULT - ASSESSMENT
64 y/o female with left hepatic lobe intraductal dilation, hx of DM-2, HTN, HLD, GERD    1-Left hepatic lobe intra-ductal dilation:  repeat MR of abd w/wo contrast P   LFTs are normal   pt is asymptomatic   NPO for tests   GI follow up post Mr fir further testing EUS / ERCP ?       2-DM-2:  off oral nmeds   monitor BG , NPO   -SS coverage, basal insulin as needed     3-HTN:  BP meds lisinopril restarted monitor increase to home dose if needed   BP was soft on admission   stable       home discharge planing pending above tests m surgery and GI input  62 y/o female with left hepatic lobe intraductal dilation, hx of DM-2, HTN, HLD, GERD    1-Left hepatic lobe intra-ductal dilation:  repeat MR of abd w/wo contrast P   LFTs are normal   pt is asymptomatic   NPO for tests   GI follow up post Mr fir further testing EUS / ERCP ?       2-DM-2:  off oral nmeds   monitor BG , NPO   -SS coverage, basal insulin as needed     3-HTN:  BP meds lisinopril restarted monitor increase to home dose if needed   BP was soft on admission   stable       home discharge planing pending above tests m surgery and GI input

## 2023-08-09 NOTE — PROGRESS NOTE ADULT - SUBJECTIVE AND OBJECTIVE BOX
Chief Complaint: This is a 63y old woman patient being seen in follow-up consultation for abnormal radiology finding; US  abdomen  revealed mild dilation of the bile duct.  MRCP revealed intrahepatic ductal dilation. Mildly elevated liver enzymes on initial lab work at [facility], normalized since 08/06. Normal TB.     Interval HPI / 24H events:  Patient seen and evaluated at bedside, reporting no complaints. Tolerating diet. Awaiting MRCP for further evaluation.      Review of Systems:  . Constitutional: No fever, chills  . HEENT: Negative  · Respiratory and Thorax: No shortness of breath, no cough  · Cardiovascular: No chest pain, palpitation, no dizziness  · Gastrointestinal: see above  · Genitourinary: No hematuria  · Musculoskeletal: Negative  · Neurological: negative  · Psychiatric: no agitation, no anxiety      PAST MEDICAL/SURGICAL HISTORY:  HTN (hypertension)    HLD (hyperlipidemia)    Diabetes    GERD (gastroesophageal reflux disease)    No significant past surgical history      MEDICATIONS  (STANDING):  dextrose 5%. 1000 milliLiter(s) (50 mL/Hr) IV Continuous <Continuous>  dextrose 5%. 1000 milliLiter(s) (100 mL/Hr) IV Continuous <Continuous>  dextrose 50% Injectable 25 Gram(s) IV Push once  dextrose 50% Injectable 12.5 Gram(s) IV Push once  dextrose 50% Injectable 25 Gram(s) IV Push once  famotidine  IVPB 20 milliGRAM(s) IV Intermittent daily  glucagon  Injectable 1 milliGRAM(s) IntraMuscular once  insulin glargine Injectable (LANTUS) 5 Unit(s) SubCutaneous at bedtime  insulin lispro (ADMELOG) corrective regimen sliding scale   SubCutaneous every 6 hours  potassium chloride    Tablet ER 20 milliEquivalent(s) Oral once  sodium chloride 0.9% lock flush 3 milliLiter(s) IV Push every 8 hours    MEDICATIONS  (PRN):  acetaminophen     Tablet .. 650 milliGRAM(s) Oral every 6 hours PRN Temp greater or equal to 38C (100.4F), Mild Pain (1 - 3)  aluminum hydroxide/magnesium hydroxide/simethicone Suspension 30 milliLiter(s) Oral every 4 hours PRN Dyspepsia  dextrose Oral Gel 15 Gram(s) Oral once PRN Blood Glucose LESS THAN 70 milliGRAM(s)/deciliter  melatonin 3 milliGRAM(s) Oral at bedtime PRN Insomnia  morphine  - Injectable 2 milliGRAM(s) IV Push every 4 hours PRN Moderate Pain (4 - 6)  ondansetron Injectable 4 milliGRAM(s) IV Push every 8 hours PRN Nausea and/or Vomiting    No Known Allergies    T(C): 37.1 (08-09-23 @ 11:04), Max: 37.1 (08-09-23 @ 11:04)  HR: 68 (08-09-23 @ 11:04) (64 - 70)  BP: 113/66 (08-09-23 @ 11:04) (113/66 - 144/82)  RR: 16 (08-09-23 @ 11:04) (16 - 18)  SpO2: 95% (08-09-23 @ 11:04) (95% - 97%)      PHYSICAL EXAM:  Constitutional: No acute distress  Neuro: Awake alert, oriented  HEENT: anicteric sclerae  Neck: supple, no JVD  CV: regular rate, regular rhythm  Pulm/chest: lung sounds CTA and equal bilaterally, no accessory muscle use noted  Abd: soft, nontender, nondistended +bowel sounds. No rigidity, rebound tenderness, or guarding  Ext: no Cyanosis, clubbing or edema  Skin: warm, no jaundice   Psych: calm, cooperative      LABS:               12.1   6.70  )-----------( 242      ( 08-09 @ 05:00 )             36.2                12.0   7.38  )-----------( 255      ( 08-07 @ 06:34 )             36.9                11.3   8.48  )-----------( 225      ( 08-06 @ 20:56 )             33.7       08-09    139  |  103  |  10.5  ----------------------------<  183<H>  3.6   |  21.0<L>  |  0.48<L>    Ca    9.0      09 Aug 2023 05:00    TPro  7.0  /  Alb  3.6  /  TBili  0.3<L>  /  DBili  0.1  /  AST  27  /  ALT  28  /  AlkPhos  75  08-09    LIVER FUNCTIONS - ( 09 Aug 2023 05:00 )  Alb: 3.6 g/dL / Pro: 7.0 g/dL / ALK PHOS: 75 U/L / ALT: 28 U/L / AST: 27 U/L / GGT: x             Lipase: 46 U/L (08-09-23 @ 05:00)       Chief Complaint: This is a 63y old woman patient being seen in follow-up consultation for abnormal radiology finding; US  abdomen  revealed mild dilation of the bile duct.  MRCP revealed intrahepatic ductal dilation. Mildly elevated liver enzymes on initial lab work at [facility], normalized since 08/06. Normal TB.     Interval HPI / 24H events:  Patient seen and evaluated at bedside, reporting no complaints. Tolerating diet. Awaiting MRCP for further evaluation.      Review of Systems:  . Constitutional: No fever, chills  . HEENT: Negative  · Respiratory and Thorax: No shortness of breath, no cough  · Cardiovascular: No chest pain, palpitation, no dizziness  · Gastrointestinal: see above  · Genitourinary: No hematuria  · Musculoskeletal: Negative  · Neurological: negative  · Psychiatric: no agitation, no anxiety      PAST MEDICAL/SURGICAL HISTORY:  HTN (hypertension)    HLD (hyperlipidemia)    Diabetes    GERD (gastroesophageal reflux disease)    No significant past surgical history      MEDICATIONS  (STANDING):  dextrose 5%. 1000 milliLiter(s) (50 mL/Hr) IV Continuous <Continuous>  dextrose 5%. 1000 milliLiter(s) (100 mL/Hr) IV Continuous <Continuous>  dextrose 50% Injectable 25 Gram(s) IV Push once  dextrose 50% Injectable 12.5 Gram(s) IV Push once  dextrose 50% Injectable 25 Gram(s) IV Push once  famotidine  IVPB 20 milliGRAM(s) IV Intermittent daily  glucagon  Injectable 1 milliGRAM(s) IntraMuscular once  insulin glargine Injectable (LANTUS) 5 Unit(s) SubCutaneous at bedtime  insulin lispro (ADMELOG) corrective regimen sliding scale   SubCutaneous every 6 hours  potassium chloride    Tablet ER 20 milliEquivalent(s) Oral once  sodium chloride 0.9% lock flush 3 milliLiter(s) IV Push every 8 hours    MEDICATIONS  (PRN):  acetaminophen     Tablet .. 650 milliGRAM(s) Oral every 6 hours PRN Temp greater or equal to 38C (100.4F), Mild Pain (1 - 3)  aluminum hydroxide/magnesium hydroxide/simethicone Suspension 30 milliLiter(s) Oral every 4 hours PRN Dyspepsia  dextrose Oral Gel 15 Gram(s) Oral once PRN Blood Glucose LESS THAN 70 milliGRAM(s)/deciliter  melatonin 3 milliGRAM(s) Oral at bedtime PRN Insomnia  morphine  - Injectable 2 milliGRAM(s) IV Push every 4 hours PRN Moderate Pain (4 - 6)  ondansetron Injectable 4 milliGRAM(s) IV Push every 8 hours PRN Nausea and/or Vomiting    No Known Allergies    T(C): 37.1 (08-09-23 @ 11:04), Max: 37.1 (08-09-23 @ 11:04)  HR: 68 (08-09-23 @ 11:04) (64 - 70)  BP: 113/66 (08-09-23 @ 11:04) (113/66 - 144/82)  RR: 16 (08-09-23 @ 11:04) (16 - 18)  SpO2: 95% (08-09-23 @ 11:04) (95% - 97%)      PHYSICAL EXAM:  Constitutional: No acute distress  Neuro: Awake alert, oriented  HEENT: anicteric sclerae  Neck: supple, no JVD  CV: regular rate, regular rhythm  Pulm/chest: lung sounds CTA and equal bilaterally, no accessory muscle use noted  Abd: soft, nontender, nondistended +bowel sounds. No rigidity, rebound tenderness, or guarding  Ext: no Cyanosis, clubbing or edema  Skin: warm, no jaundice   Psych: calm, cooperative      LABS:               12.1   6.70  )-----------( 242      ( 08-09 @ 05:00 )             36.2                12.0   7.38  )-----------( 255      ( 08-07 @ 06:34 )             36.9                11.3   8.48  )-----------( 225      ( 08-06 @ 20:56 )             33.7       08-09    139  |  103  |  10.5  ----------------------------<  183<H>  3.6   |  21.0<L>  |  0.48<L>    Ca    9.0      09 Aug 2023 05:00    TPro  7.0  /  Alb  3.6  /  TBili  0.3<L>  /  DBili  0.1  /  AST  27  /  ALT  28  /  AlkPhos  75  08-09    LIVER FUNCTIONS - ( 09 Aug 2023 05:00 )  Alb: 3.6 g/dL / Pro: 7.0 g/dL / ALK PHOS: 75 U/L / ALT: 28 U/L / AST: 27 U/L / GGT: x             Lipase: 46 U/L (08-09-23 @ 05:00)       Chief Complaint: This is a 63y old woman patient being seen in follow-up consultation for abnormal radiology finding; US  abdomen  revealed mild dilation of the bile duct.  MRCP revealed intrahepatic ductal dilation. Mildly elevated liver enzymes on initial lab work at [facility], normalized since 08/06. Normal TB.     Interval HPI / 24H events:  Patient seen and evaluated at bedside, reporting no complaints. Tolerating diet. Awaiting MRCP for further evaluation.      Review of Systems:  . Constitutional: No fever, chills  . HEENT: Negative  · Respiratory and Thorax: No shortness of breath, no cough  · Cardiovascular: No chest pain, palpitation, no dizziness  · Gastrointestinal: see above  · Genitourinary: No hematuria  · Musculoskeletal: Negative  · Neurological: negative  · Psychiatric: no agitation, no anxiety      PAST MEDICAL/SURGICAL HISTORY:  HTN (hypertension)    HLD (hyperlipidemia)    Diabetes    GERD (gastroesophageal reflux disease)    No significant past surgical history      MEDICATIONS  (STANDING):  dextrose 5%. 1000 milliLiter(s) (50 mL/Hr) IV Continuous <Continuous>  dextrose 5%. 1000 milliLiter(s) (100 mL/Hr) IV Continuous <Continuous>  dextrose 50% Injectable 25 Gram(s) IV Push once  dextrose 50% Injectable 12.5 Gram(s) IV Push once  dextrose 50% Injectable 25 Gram(s) IV Push once  famotidine  IVPB 20 milliGRAM(s) IV Intermittent daily  glucagon  Injectable 1 milliGRAM(s) IntraMuscular once  insulin glargine Injectable (LANTUS) 5 Unit(s) SubCutaneous at bedtime  insulin lispro (ADMELOG) corrective regimen sliding scale   SubCutaneous every 6 hours  potassium chloride    Tablet ER 20 milliEquivalent(s) Oral once  sodium chloride 0.9% lock flush 3 milliLiter(s) IV Push every 8 hours    MEDICATIONS  (PRN):  acetaminophen     Tablet .. 650 milliGRAM(s) Oral every 6 hours PRN Temp greater or equal to 38C (100.4F), Mild Pain (1 - 3)  aluminum hydroxide/magnesium hydroxide/simethicone Suspension 30 milliLiter(s) Oral every 4 hours PRN Dyspepsia  dextrose Oral Gel 15 Gram(s) Oral once PRN Blood Glucose LESS THAN 70 milliGRAM(s)/deciliter  melatonin 3 milliGRAM(s) Oral at bedtime PRN Insomnia  morphine  - Injectable 2 milliGRAM(s) IV Push every 4 hours PRN Moderate Pain (4 - 6)  ondansetron Injectable 4 milliGRAM(s) IV Push every 8 hours PRN Nausea and/or Vomiting    No Known Allergies    T(C): 37.1 (08-09-23 @ 11:04), Max: 37.1 (08-09-23 @ 11:04)  HR: 68 (08-09-23 @ 11:04) (64 - 70)  BP: 113/66 (08-09-23 @ 11:04) (113/66 - 144/82)  RR: 16 (08-09-23 @ 11:04) (16 - 18)  SpO2: 95% (08-09-23 @ 11:04) (95% - 97%)      PHYSICAL EXAM:  Constitutional: No acute distress  Neuro: Awake alert, oriented  HEENT: anicteric sclerae  Neck: supple, no JVD  CV: regular rate, regular rhythm  Pulm/chest: lung sounds CTA and equal bilaterally, no accessory muscle use noted  Abd: soft, nontender, nondistended +bowel sounds. No rigidity, rebound tenderness, or guarding  Ext: no Cyanosis, clubbing or edema  Skin: warm, no jaundice   Psych: calm, cooperative      LABS:               12.1   6.70  )-----------( 242      ( 08-09 @ 05:00 )             36.2                12.0   7.38  )-----------( 255      ( 08-07 @ 06:34 )             36.9                11.3   8.48  )-----------( 225      ( 08-06 @ 20:56 )             33.7       08-09    139  |  103  |  10.5  ----------------------------<  183<H>  3.6   |  21.0<L>  |  0.48<L>    Ca    9.0      09 Aug 2023 05:00    TPro  7.0  /  Alb  3.6  /  TBili  0.3<L>  /  DBili  0.1  /  AST  27  /  ALT  28  /  AlkPhos  75  08-09    LIVER FUNCTIONS - ( 09 Aug 2023 05:00 )  Alb: 3.6 g/dL / Pro: 7.0 g/dL / ALK PHOS: 75 U/L / ALT: 28 U/L / AST: 27 U/L / GGT: x             Lipase: 46 U/L (08-09-23 @ 05:00)

## 2023-08-10 ENCOUNTER — RESULT REVIEW (OUTPATIENT)
Age: 63
End: 2023-08-10

## 2023-08-10 ENCOUNTER — TRANSCRIPTION ENCOUNTER (OUTPATIENT)
Age: 63
End: 2023-08-10

## 2023-08-10 LAB
ALBUMIN SERPL ELPH-MCNC: 3.9 G/DL — SIGNIFICANT CHANGE UP (ref 3.3–5.2)
ALP SERPL-CCNC: 78 U/L — SIGNIFICANT CHANGE UP (ref 40–120)
ALT FLD-CCNC: 34 U/L — HIGH
ANION GAP SERPL CALC-SCNC: 11 MMOL/L — SIGNIFICANT CHANGE UP (ref 5–17)
APTT BLD: 29.4 SEC — SIGNIFICANT CHANGE UP (ref 24.5–35.6)
AST SERPL-CCNC: 33 U/L — HIGH
BILIRUB SERPL-MCNC: 0.4 MG/DL — SIGNIFICANT CHANGE UP (ref 0.4–2)
BLD GP AB SCN SERPL QL: SIGNIFICANT CHANGE UP
BUN SERPL-MCNC: 9.8 MG/DL — SIGNIFICANT CHANGE UP (ref 8–20)
CALCIUM SERPL-MCNC: 9.2 MG/DL — SIGNIFICANT CHANGE UP (ref 8.4–10.5)
CHLORIDE SERPL-SCNC: 100 MMOL/L — SIGNIFICANT CHANGE UP (ref 96–108)
CO2 SERPL-SCNC: 25 MMOL/L — SIGNIFICANT CHANGE UP (ref 22–29)
CREAT SERPL-MCNC: 0.54 MG/DL — SIGNIFICANT CHANGE UP (ref 0.5–1.3)
EGFR: 105 ML/MIN/1.73M2 — SIGNIFICANT CHANGE UP
GLUCOSE BLDC GLUCOMTR-MCNC: 172 MG/DL — HIGH (ref 70–99)
GLUCOSE BLDC GLUCOMTR-MCNC: 180 MG/DL — HIGH (ref 70–99)
GLUCOSE BLDC GLUCOMTR-MCNC: 198 MG/DL — HIGH (ref 70–99)
GLUCOSE BLDC GLUCOMTR-MCNC: 200 MG/DL — HIGH (ref 70–99)
GLUCOSE BLDC GLUCOMTR-MCNC: 209 MG/DL — HIGH (ref 70–99)
GLUCOSE SERPL-MCNC: 194 MG/DL — HIGH (ref 70–99)
HCT VFR BLD CALC: 38.1 % — SIGNIFICANT CHANGE UP (ref 34.5–45)
HGB BLD-MCNC: 12.9 G/DL — SIGNIFICANT CHANGE UP (ref 11.5–15.5)
INR BLD: 1.05 RATIO — SIGNIFICANT CHANGE UP (ref 0.85–1.18)
MCHC RBC-ENTMCNC: 28.4 PG — SIGNIFICANT CHANGE UP (ref 27–34)
MCHC RBC-ENTMCNC: 33.9 GM/DL — SIGNIFICANT CHANGE UP (ref 32–36)
MCV RBC AUTO: 83.9 FL — SIGNIFICANT CHANGE UP (ref 80–100)
PLATELET # BLD AUTO: 258 K/UL — SIGNIFICANT CHANGE UP (ref 150–400)
POTASSIUM SERPL-MCNC: 3.6 MMOL/L — SIGNIFICANT CHANGE UP (ref 3.5–5.3)
POTASSIUM SERPL-SCNC: 3.6 MMOL/L — SIGNIFICANT CHANGE UP (ref 3.5–5.3)
PROT SERPL-MCNC: 7.5 G/DL — SIGNIFICANT CHANGE UP (ref 6.6–8.7)
PROTHROM AB SERPL-ACNC: 11.6 SEC — SIGNIFICANT CHANGE UP (ref 9.5–13)
RBC # BLD: 4.54 M/UL — SIGNIFICANT CHANGE UP (ref 3.8–5.2)
RBC # FLD: 12.6 % — SIGNIFICANT CHANGE UP (ref 10.3–14.5)
SODIUM SERPL-SCNC: 136 MMOL/L — SIGNIFICANT CHANGE UP (ref 135–145)
WBC # BLD: 6.22 K/UL — SIGNIFICANT CHANGE UP (ref 3.8–10.5)
WBC # FLD AUTO: 6.22 K/UL — SIGNIFICANT CHANGE UP (ref 3.8–10.5)

## 2023-08-10 PROCEDURE — 99233 SBSQ HOSP IP/OBS HIGH 50: CPT

## 2023-08-10 PROCEDURE — 43274 ERCP DUCT STENT PLACEMENT: CPT

## 2023-08-10 PROCEDURE — 88341 IMHCHEM/IMCYTCHM EA ADD ANTB: CPT | Mod: 26

## 2023-08-10 PROCEDURE — 88342 IMHCHEM/IMCYTCHM 1ST ANTB: CPT | Mod: 26

## 2023-08-10 PROCEDURE — 88365 INSITU HYBRIDIZATION (FISH): CPT | Mod: 26

## 2023-08-10 PROCEDURE — 88305 TISSUE EXAM BY PATHOLOGIST: CPT | Mod: 26

## 2023-08-10 PROCEDURE — 88364 INSITU HYBRIDIZATION (FISH): CPT | Mod: 26

## 2023-08-10 PROCEDURE — 43239 EGD BIOPSY SINGLE/MULTIPLE: CPT | Mod: 59

## 2023-08-10 DEVICE — CATH BLLN BIL RX 9-12CM
Type: IMPLANTABLE DEVICE | Status: NON-FUNCTIONAL
Removed: 2023-08-10

## 2023-08-10 DEVICE — STENT BIL PIGTAIL DBL 7FRX5CM
Type: IMPLANTABLE DEVICE | Status: NON-FUNCTIONAL
Removed: 2023-08-10

## 2023-08-10 DEVICE — STENT BIL W NAVIFLEX DELIVERY 7FR
Type: IMPLANTABLE DEVICE | Status: NON-FUNCTIONAL
Removed: 2023-08-10

## 2023-08-10 DEVICE — WIRE GUIDE VISIGLIDE ANGLED .35X270CM
Type: IMPLANTABLE DEVICE | Status: NON-FUNCTIONAL
Removed: 2023-08-10

## 2023-08-10 DEVICE — HYDRATOME 44
Type: IMPLANTABLE DEVICE | Status: NON-FUNCTIONAL
Removed: 2023-08-10

## 2023-08-10 RX ADMIN — INSULIN GLARGINE 5 UNIT(S): 100 INJECTION, SOLUTION SUBCUTANEOUS at 22:20

## 2023-08-10 RX ADMIN — SODIUM CHLORIDE 3 MILLILITER(S): 9 INJECTION INTRAMUSCULAR; INTRAVENOUS; SUBCUTANEOUS at 06:57

## 2023-08-10 RX ADMIN — Medication 100 MILLIGRAM(S): at 14:30

## 2023-08-10 RX ADMIN — MORPHINE SULFATE 2 MILLIGRAM(S): 50 CAPSULE, EXTENDED RELEASE ORAL at 23:20

## 2023-08-10 RX ADMIN — Medication 2: at 05:35

## 2023-08-10 RX ADMIN — Medication 4: at 23:13

## 2023-08-10 RX ADMIN — Medication 2: at 12:31

## 2023-08-10 RX ADMIN — Medication 650 MILLIGRAM(S): at 17:03

## 2023-08-10 RX ADMIN — Medication 3 MILLIGRAM(S): at 22:21

## 2023-08-10 RX ADMIN — FAMOTIDINE 100 MILLIGRAM(S): 10 INJECTION INTRAVENOUS at 16:56

## 2023-08-10 RX ADMIN — Medication 2: at 17:04

## 2023-08-10 RX ADMIN — Medication 200 MILLIGRAM(S): at 18:21

## 2023-08-10 RX ADMIN — Medication 650 MILLIGRAM(S): at 18:03

## 2023-08-10 RX ADMIN — MORPHINE SULFATE 2 MILLIGRAM(S): 50 CAPSULE, EXTENDED RELEASE ORAL at 22:20

## 2023-08-10 RX ADMIN — Medication 200 MILLIGRAM(S): at 05:07

## 2023-08-10 RX ADMIN — SODIUM CHLORIDE 3 MILLILITER(S): 9 INJECTION INTRAMUSCULAR; INTRAVENOUS; SUBCUTANEOUS at 23:10

## 2023-08-10 NOTE — PROGRESS NOTE ADULT - SUBJECTIVE AND OBJECTIVE BOX
Subjective: 62 yo f here for left hepatic lobe intraductal dilation. Patient seen and examined at bedside this morning, no overnight events. Patient denies Denies fever, chills, nausea, vomiting, chest pain, SOB, dizziness, abd pain.     MEDICATIONS  (STANDING):  ciprofloxacin   IVPB 400 milliGRAM(s) IV Intermittent every 12 hours  dextrose 5%. 1000 milliLiter(s) (100 mL/Hr) IV Continuous <Continuous>  dextrose 5%. 1000 milliLiter(s) (50 mL/Hr) IV Continuous <Continuous>  dextrose 50% Injectable 12.5 Gram(s) IV Push once  dextrose 50% Injectable 25 Gram(s) IV Push once  dextrose 50% Injectable 25 Gram(s) IV Push once  famotidine  IVPB 20 milliGRAM(s) IV Intermittent daily  glucagon  Injectable 1 milliGRAM(s) IntraMuscular once  indomethacin Suppository 100 milliGRAM(s) Rectal once  insulin glargine Injectable (LANTUS) 5 Unit(s) SubCutaneous at bedtime  insulin lispro (ADMELOG) corrective regimen sliding scale   SubCutaneous every 6 hours  sodium chloride 0.9% lock flush 3 milliLiter(s) IV Push every 8 hours    MEDICATIONS  (PRN):  acetaminophen     Tablet .. 650 milliGRAM(s) Oral every 6 hours PRN Temp greater or equal to 38C (100.4F), Mild Pain (1 - 3)  aluminum hydroxide/magnesium hydroxide/simethicone Suspension 30 milliLiter(s) Oral every 4 hours PRN Dyspepsia  dextrose Oral Gel 15 Gram(s) Oral once PRN Blood Glucose LESS THAN 70 milliGRAM(s)/deciliter  melatonin 3 milliGRAM(s) Oral at bedtime PRN Insomnia  morphine  - Injectable 2 milliGRAM(s) IV Push every 4 hours PRN Moderate Pain (4 - 6)  ondansetron Injectable 4 milliGRAM(s) IV Push every 8 hours PRN Nausea and/or Vomiting      Vital Signs Last 24 Hrs  T(C): 37 (10 Aug 2023 04:15), Max: 37.1 (09 Aug 2023 11:04)  T(F): 98.6 (10 Aug 2023 04:15), Max: 98.8 (09 Aug 2023 11:04)  HR: 63 (10 Aug 2023 04:15) (63 - 99)  BP: 101/63 (10 Aug 2023 04:15) (101/63 - 144/80)  BP(mean): --  RR: 18 (10 Aug 2023 04:15) (16 - 18)  SpO2: 93% (10 Aug 2023 04:15) (93% - 96%)    Parameters below as of 10 Aug 2023 04:15  Patient On (Oxygen Delivery Method): room air        Physical Exam:  Constitutional: patient resting comfortably in bed, in no acute distress  HEENT: EOMI, PERRLA, MMM.  Respiratory: Non labored breathing on RA  Cardiovascular: RRR  Gastrointestinal: Abdomen soft, non-tender, non-distended, no rebound tenderness / guarding  Musculoskeletal: No joint pain, swelling or deformity; no limitation of movement  Vascular: Extremities warm and well perfused.     LABS:                        12.1   6.70  )-----------( 242      ( 09 Aug 2023 05:00 )             36.2     08-09    139  |  103  |  10.5  ----------------------------<  183<H>  3.6   |  21.0<L>  |  0.48<L>    Ca    9.0      09 Aug 2023 05:00    TPro  7.0  /  Alb  3.6  /  TBili  0.3<L>  /  DBili  0.1  /  AST  27  /  ALT  28  /  AlkPhos  75  08-09      Urinalysis Basic - ( 09 Aug 2023 05:00 )    Color: x / Appearance: x / SG: x / pH: x  Gluc: 183 mg/dL / Ketone: x  / Bili: x / Urobili: x   Blood: x / Protein: x / Nitrite: x   Leuk Esterase: x / RBC: x / WBC x   Sq Epi: x / Non Sq Epi: x / Bacteria: x        A: Patient is a 63y old Female who was incidentally found to have CBD dilation c/f choledocholithiasis and MRCP with localized hepatic intraductal dilation, transferred here for advanced GI care. Four days ago she started experiencing upper abdominal pain and N/V, diarrhea which prompted her to go to [facility] [place] where the above was found. Since admission, her symptoms have improved. No acholic stools, dark urine, jaundice, scleral icterus. She denies any h/o trauma, smoking, drinking, hepatitis, family or personal h/o cancer. She says she had a similar pain ~1 yr ago and was told she had a fatty liver.      Patient denies pain, tolerating diet, tumor markers negative yesterday. GI recommending repeat MRCP, no ERCP needed at this time.      Plan:   - F/U repeat MRCP, no ERCP needed at this time as per GI   - Tumor markers (CEA, CA 19-9) ordered, labs pending   - Will follow up with GI for plan  - Will continue to follow        Subjective: 64 yo f here for left hepatic lobe intraductal dilation. Patient seen and examined at bedside this morning, no overnight events. Patient denies Denies fever, chills, nausea, vomiting, chest pain, SOB, dizziness, abd pain.     MEDICATIONS  (STANDING):  ciprofloxacin   IVPB 400 milliGRAM(s) IV Intermittent every 12 hours  dextrose 5%. 1000 milliLiter(s) (100 mL/Hr) IV Continuous <Continuous>  dextrose 5%. 1000 milliLiter(s) (50 mL/Hr) IV Continuous <Continuous>  dextrose 50% Injectable 12.5 Gram(s) IV Push once  dextrose 50% Injectable 25 Gram(s) IV Push once  dextrose 50% Injectable 25 Gram(s) IV Push once  famotidine  IVPB 20 milliGRAM(s) IV Intermittent daily  glucagon  Injectable 1 milliGRAM(s) IntraMuscular once  indomethacin Suppository 100 milliGRAM(s) Rectal once  insulin glargine Injectable (LANTUS) 5 Unit(s) SubCutaneous at bedtime  insulin lispro (ADMELOG) corrective regimen sliding scale   SubCutaneous every 6 hours  sodium chloride 0.9% lock flush 3 milliLiter(s) IV Push every 8 hours    MEDICATIONS  (PRN):  acetaminophen     Tablet .. 650 milliGRAM(s) Oral every 6 hours PRN Temp greater or equal to 38C (100.4F), Mild Pain (1 - 3)  aluminum hydroxide/magnesium hydroxide/simethicone Suspension 30 milliLiter(s) Oral every 4 hours PRN Dyspepsia  dextrose Oral Gel 15 Gram(s) Oral once PRN Blood Glucose LESS THAN 70 milliGRAM(s)/deciliter  melatonin 3 milliGRAM(s) Oral at bedtime PRN Insomnia  morphine  - Injectable 2 milliGRAM(s) IV Push every 4 hours PRN Moderate Pain (4 - 6)  ondansetron Injectable 4 milliGRAM(s) IV Push every 8 hours PRN Nausea and/or Vomiting      Vital Signs Last 24 Hrs  T(C): 37 (10 Aug 2023 04:15), Max: 37.1 (09 Aug 2023 11:04)  T(F): 98.6 (10 Aug 2023 04:15), Max: 98.8 (09 Aug 2023 11:04)  HR: 63 (10 Aug 2023 04:15) (63 - 99)  BP: 101/63 (10 Aug 2023 04:15) (101/63 - 144/80)  BP(mean): --  RR: 18 (10 Aug 2023 04:15) (16 - 18)  SpO2: 93% (10 Aug 2023 04:15) (93% - 96%)    Parameters below as of 10 Aug 2023 04:15  Patient On (Oxygen Delivery Method): room air        Physical Exam:  Constitutional: patient resting comfortably in bed, in no acute distress  HEENT: EOMI, PERRLA, MMM.  Respiratory: Non labored breathing on RA  Cardiovascular: RRR  Gastrointestinal: Abdomen soft, non-tender, non-distended, no rebound tenderness / guarding  Musculoskeletal: No joint pain, swelling or deformity; no limitation of movement  Vascular: Extremities warm and well perfused.     LABS:                        12.1   6.70  )-----------( 242      ( 09 Aug 2023 05:00 )             36.2     08-09    139  |  103  |  10.5  ----------------------------<  183<H>  3.6   |  21.0<L>  |  0.48<L>    Ca    9.0      09 Aug 2023 05:00    TPro  7.0  /  Alb  3.6  /  TBili  0.3<L>  /  DBili  0.1  /  AST  27  /  ALT  28  /  AlkPhos  75  08-09      Urinalysis Basic - ( 09 Aug 2023 05:00 )    Color: x / Appearance: x / SG: x / pH: x  Gluc: 183 mg/dL / Ketone: x  / Bili: x / Urobili: x   Blood: x / Protein: x / Nitrite: x   Leuk Esterase: x / RBC: x / WBC x   Sq Epi: x / Non Sq Epi: x / Bacteria: x        A: Patient is a 63y old Female who was incidentally found to have CBD dilation c/f choledocholithiasis and MRCP with localized hepatic intraductal dilation, transferred here for advanced GI care. Four days ago she started experiencing upper abdominal pain and N/V, diarrhea which [place] [facility] [place]. Since admission, her symptoms have improved. No acholic stools, dark urine, jaundice, scleral icterus. She denies any h/o trauma, smoking, drinking, hepatitis, family or personal h/o cancer. She says she had a similar pain ~1 yr ago and was told she had a fatty liver.      Patient denies pain, tolerating diet, tumor markers negative yesterday. GI recommending repeat MRCP, no ERCP needed at this time.      Plan:   - F/U repeat MRCP, no ERCP needed at this time as per GI   - Tumor markers (CEA, CA 19-9) ordered, labs pending   - Will follow up with GI for plan  - Will continue to follow        Subjective: 62 yo f here for left hepatic lobe intraductal dilation. Patient seen and examined at bedside this morning, no overnight events. Patient denies Denies fever, chills, nausea, vomiting, chest pain, SOB, dizziness, abd pain.     MEDICATIONS  (STANDING):  ciprofloxacin   IVPB 400 milliGRAM(s) IV Intermittent every 12 hours  dextrose 5%. 1000 milliLiter(s) (100 mL/Hr) IV Continuous <Continuous>  dextrose 5%. 1000 milliLiter(s) (50 mL/Hr) IV Continuous <Continuous>  dextrose 50% Injectable 12.5 Gram(s) IV Push once  dextrose 50% Injectable 25 Gram(s) IV Push once  dextrose 50% Injectable 25 Gram(s) IV Push once  famotidine  IVPB 20 milliGRAM(s) IV Intermittent daily  glucagon  Injectable 1 milliGRAM(s) IntraMuscular once  indomethacin Suppository 100 milliGRAM(s) Rectal once  insulin glargine Injectable (LANTUS) 5 Unit(s) SubCutaneous at bedtime  insulin lispro (ADMELOG) corrective regimen sliding scale   SubCutaneous every 6 hours  sodium chloride 0.9% lock flush 3 milliLiter(s) IV Push every 8 hours    MEDICATIONS  (PRN):  acetaminophen     Tablet .. 650 milliGRAM(s) Oral every 6 hours PRN Temp greater or equal to 38C (100.4F), Mild Pain (1 - 3)  aluminum hydroxide/magnesium hydroxide/simethicone Suspension 30 milliLiter(s) Oral every 4 hours PRN Dyspepsia  dextrose Oral Gel 15 Gram(s) Oral once PRN Blood Glucose LESS THAN 70 milliGRAM(s)/deciliter  melatonin 3 milliGRAM(s) Oral at bedtime PRN Insomnia  morphine  - Injectable 2 milliGRAM(s) IV Push every 4 hours PRN Moderate Pain (4 - 6)  ondansetron Injectable 4 milliGRAM(s) IV Push every 8 hours PRN Nausea and/or Vomiting      Vital Signs Last 24 Hrs  T(C): 37 (10 Aug 2023 04:15), Max: 37.1 (09 Aug 2023 11:04)  T(F): 98.6 (10 Aug 2023 04:15), Max: 98.8 (09 Aug 2023 11:04)  HR: 63 (10 Aug 2023 04:15) (63 - 99)  BP: 101/63 (10 Aug 2023 04:15) (101/63 - 144/80)  BP(mean): --  RR: 18 (10 Aug 2023 04:15) (16 - 18)  SpO2: 93% (10 Aug 2023 04:15) (93% - 96%)    Parameters below as of 10 Aug 2023 04:15  Patient On (Oxygen Delivery Method): room air        Physical Exam:  Constitutional: patient resting comfortably in bed, in no acute distress  HEENT: EOMI, PERRLA, MMM.  Respiratory: Non labored breathing on RA  Cardiovascular: RRR  Gastrointestinal: Abdomen soft, non-tender, non-distended, no rebound tenderness / guarding  Musculoskeletal: No joint pain, swelling or deformity; no limitation of movement  Vascular: Extremities warm and well perfused.     LABS:                        12.1   6.70  )-----------( 242      ( 09 Aug 2023 05:00 )             36.2     08-09    139  |  103  |  10.5  ----------------------------<  183<H>  3.6   |  21.0<L>  |  0.48<L>    Ca    9.0      09 Aug 2023 05:00    TPro  7.0  /  Alb  3.6  /  TBili  0.3<L>  /  DBili  0.1  /  AST  27  /  ALT  28  /  AlkPhos  75  08-09      Urinalysis Basic - ( 09 Aug 2023 05:00 )    Color: x / Appearance: x / SG: x / pH: x  Gluc: 183 mg/dL / Ketone: x  / Bili: x / Urobili: x   Blood: x / Protein: x / Nitrite: x   Leuk Esterase: x / RBC: x / WBC x   Sq Epi: x / Non Sq Epi: x / Bacteria: x        A: Patient is a 63y old Female who was incidentally found to have CBD dilation c/f choledocholithiasis and MRCP with localized hepatic intraductal dilation, transferred here for advanced GI care. Four days ago she started experiencing upper abdominal pain and N/V, diarrhea which prompted her to go to [facility] [place] where the above was found. Since admission, her symptoms have improved. No acholic stools, dark urine, jaundice, scleral icterus. She denies any h/o trauma, smoking, drinking, hepatitis, family or personal h/o cancer. She says she had a similar pain ~1 yr ago and was told she had a fatty liver.      Patient denies pain, tolerating diet, tumor markers negative yesterday. GI recommending repeat MRCP, no ERCP needed at this time.      Plan:   - F/U repeat MRCP, no ERCP needed at this time as per GI   - Tumor markers (CEA, CA 19-9) ordered, labs pending   - Will follow up with GI for plan  - Will continue to follow

## 2023-08-10 NOTE — CHART NOTE - NSCHARTNOTEFT_GEN_A_CORE
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    Plan:  Patient to follow up with Dr Ansari within one week of discharge for surgical plan    The patient and Dr Canchola were both updated

## 2023-08-10 NOTE — PROGRESS NOTE ADULT - SUBJECTIVE AND OBJECTIVE BOX
RY STRONG Patient is a 63y old  Female who presents with a chief complaint of Choledocholithiasis (10 Aug 2023 08:22)     HPI:  64 y/o female with hx of DM-2, HTN, HLD, GERD who went to [facility] [place] c/o RUQ pain, Nausea, followed by NBNB vomits. Describes symptoms as sudden onset with no recent illnesses, travel, or sick contacts. She denies any changes in her medications. At [facility] [place] noted to have stable vitals, RUQ pain on exam, elevated AP with mild elevation of ALT, normal AST and normal bili. RUQ sono with mild CBD dilation, MRCP w/ localized left hepatic intraductal dilation concerning for neoplasm, stone, or stricture. Case was discussed with GI on call who agreed to have Patient transferred here for ERCP. At this time denies N/V, pain, SOB, F/C. Vitals stable. Seen with .  (07 Aug 2023 01:15)    The patient was seen and evaluated stones - needs ERCP today  The patient is in no acute distress.  Denied any fever chest pain, palpitations, shortness of breath, dysuria, cough, edema       I&O's Summary    Allergies    No Known Allergies    Intolerances      HEALTH ISSUES - PROBLEM Dx:  Abnormal finding on radiology exam          PAST MEDICAL & SURGICAL HISTORY:  HTN (hypertension)      HLD (hyperlipidemia)      Diabetes      GERD (gastroesophageal reflux disease)      No significant past surgical history              Vital Signs Last 24 Hrs  T(C): 37 (10 Aug 2023 09:15), Max: 37 (10 Aug 2023 04:15)  T(F): 98.6 (10 Aug 2023 09:15), Max: 98.6 (10 Aug 2023 04:15)  HR: 64 (10 Aug 2023 09:15) (63 - 99)  BP: 117/74 (10 Aug 2023 09:15) (101/63 - 144/80)  BP(mean): --  RR: 18 (10 Aug 2023 09:15) (18 - 18)  SpO2: 95% (10 Aug 2023 09:15) (93% - 96%)    Parameters below as of 10 Aug 2023 09:15  Patient On (Oxygen Delivery Method): room air    T(C): 37 (08-10-23 @ 09:15), Max: 37 (08-10-23 @ 04:15)  HR: 64 (08-10-23 @ 09:15) (63 - 99)  BP: 117/74 (08-10-23 @ 09:15) (101/63 - 144/80)  RR: 18 (08-10-23 @ 09:15) (18 - 18)  SpO2: 95% (08-10-23 @ 09:15) (93% - 96%)  Wt(kg): --    PHYSICAL EXAM:    GENERAL: NAD, pleasant conversing   HEAD:  Atraumatic, Normocephalic  EYES: EOMI, PERRL, conjunctiva and sclera clear  ENMT:  Moist mucous membranes,  No lesions  NECK: Supple, No JVD, Normal thyroid  NERVOUS SYSTEM:  Alert & Oriented X3,  Moves upper and lower extremities; CNS-II-XII  CHEST/LUNG: Clear to auscultation bilaterally; No rales, rhonchi, wheezing,   HEART: Regular rate and rhythm; No murmurs,   ABDOMEN: Soft,  RUQ tender, Nondistended; Bowel sounds present  EXTREMITIES:  Peripheral Pulses, No  cyanosis, or edema  SKIN: No rashes or lesions  psychiatry- mood and affect appropriate, Insight and judgement intact     acetaminophen     Tablet .. 650 milliGRAM(s) Oral every 6 hours PRN  aluminum hydroxide/magnesium hydroxide/simethicone Suspension 30 milliLiter(s) Oral every 4 hours PRN  ciprofloxacin   IVPB 400 milliGRAM(s) IV Intermittent every 12 hours  dextrose 5%. 1000 milliLiter(s) IV Continuous <Continuous>  dextrose 5%. 1000 milliLiter(s) IV Continuous <Continuous>  dextrose 50% Injectable 12.5 Gram(s) IV Push once  dextrose 50% Injectable 25 Gram(s) IV Push once  dextrose 50% Injectable 25 Gram(s) IV Push once  dextrose Oral Gel 15 Gram(s) Oral once PRN  famotidine  IVPB 20 milliGRAM(s) IV Intermittent daily  glucagon  Injectable 1 milliGRAM(s) IntraMuscular once  indomethacin Suppository 100 milliGRAM(s) Rectal once  insulin glargine Injectable (LANTUS) 5 Unit(s) SubCutaneous at bedtime  insulin lispro (ADMELOG) corrective regimen sliding scale   SubCutaneous every 6 hours  melatonin 3 milliGRAM(s) Oral at bedtime PRN  morphine  - Injectable 2 milliGRAM(s) IV Push every 4 hours PRN  ondansetron Injectable 4 milliGRAM(s) IV Push every 8 hours PRN  sodium chloride 0.9% lock flush 3 milliLiter(s) IV Push every 8 hours      LABS:                          12.9   6.22  )-----------( 258      ( 10 Aug 2023 09:34 )             38.1     08-10    136  |  100  |  9.8  ----------------------------<  194<H>  3.6   |  25.0  |  0.54    Ca    9.2      10 Aug 2023 09:34    TPro  7.5  /  Alb  3.9  /  TBili  0.4  /  DBili  x   /  AST  33<H>  /  ALT  34<H>  /  AlkPhos  78  08-10    LIVER FUNCTIONS - ( 10 Aug 2023 09:34 )  Alb: 3.9 g/dL / Pro: 7.5 g/dL / ALK PHOS: 78 U/L / ALT: 34 U/L / AST: 33 U/L / GGT: x           PT/INR - ( 10 Aug 2023 09:34 )   PT: 11.6 sec;   INR: 1.05 ratio         PTT - ( 10 Aug 2023 09:34 )  PTT:29.4 sec      Urinalysis Basic - ( 10 Aug 2023 09:34 )    Color: x / Appearance: x / SG: x / pH: x  Gluc: 194 mg/dL / Ketone: x  / Bili: x / Urobili: x   Blood: x / Protein: x / Nitrite: x   Leuk Esterase: x / RBC: x / WBC x   Sq Epi: x / Non Sq Epi: x / Bacteria: x      CAPILLARY BLOOD GLUCOSE      POCT Blood Glucose.: 172 mg/dL (10 Aug 2023 12:29)  POCT Blood Glucose.: 198 mg/dL (10 Aug 2023 05:34)  POCT Blood Glucose.: 180 mg/dL (10 Aug 2023 05:27)  POCT Blood Glucose.: 170 mg/dL (09 Aug 2023 23:25)  POCT Blood Glucose.: 184 mg/dL (09 Aug 2023 22:02)  POCT Blood Glucose.: 117 mg/dL (09 Aug 2023 16:44)      RADIOLOGY & ADDITIONAL TESTS:      Consultant notes reviewed    Case discussed with consultant/provider/ family /patient  RY STRONG Patient is a 63y old  Female who presents with a chief complaint of Choledocholithiasis (10 Aug 2023 08:22)     HPI:  62 y/o female with hx of DM-2, HTN, HLD, GERD who went to [facility] [place] earlier today c/o RUQ pain, Nausea, followed by NBNB vomits. Describes symptoms as sudden onset with no recent illnesses, travel, or sick contacts. She denies any changes in her medications. At [facility] [place] noted to have stable vitals, RUQ pain on exam, elevated AP with mild elevation of ALT, normal AST and normal bili. RUQ sono with mild CBD dilation, MRCP w/ localized left hepatic intraductal dilation concerning for neoplasm, stone, or stricture. Case was discussed with GI on call who agreed to have Patient transferred here for ERCP. At this time denies N/V, pain, SOB, F/C. Vitals stable. Seen with .  (07 Aug 2023 01:15)    The patient was seen and evaluated stones - needs ERCP today  The patient is in no acute distress.  Denied any fever chest pain, palpitations, shortness of breath, dysuria, cough, edema       I&O's Summary    Allergies    No Known Allergies    Intolerances      HEALTH ISSUES - PROBLEM Dx:  Abnormal finding on radiology exam          PAST MEDICAL & SURGICAL HISTORY:  HTN (hypertension)      HLD (hyperlipidemia)      Diabetes      GERD (gastroesophageal reflux disease)      No significant past surgical history              Vital Signs Last 24 Hrs  T(C): 37 (10 Aug 2023 09:15), Max: 37 (10 Aug 2023 04:15)  T(F): 98.6 (10 Aug 2023 09:15), Max: 98.6 (10 Aug 2023 04:15)  HR: 64 (10 Aug 2023 09:15) (63 - 99)  BP: 117/74 (10 Aug 2023 09:15) (101/63 - 144/80)  BP(mean): --  RR: 18 (10 Aug 2023 09:15) (18 - 18)  SpO2: 95% (10 Aug 2023 09:15) (93% - 96%)    Parameters below as of 10 Aug 2023 09:15  Patient On (Oxygen Delivery Method): room air    T(C): 37 (08-10-23 @ 09:15), Max: 37 (08-10-23 @ 04:15)  HR: 64 (08-10-23 @ 09:15) (63 - 99)  BP: 117/74 (08-10-23 @ 09:15) (101/63 - 144/80)  RR: 18 (08-10-23 @ 09:15) (18 - 18)  SpO2: 95% (08-10-23 @ 09:15) (93% - 96%)  Wt(kg): --    PHYSICAL EXAM:    GENERAL: NAD, pleasant conversing   HEAD:  Atraumatic, Normocephalic  EYES: EOMI, PERRL, conjunctiva and sclera clear  ENMT:  Moist mucous membranes,  No lesions  NECK: Supple, No JVD, Normal thyroid  NERVOUS SYSTEM:  Alert & Oriented X3,  Moves upper and lower extremities; CNS-II-XII  CHEST/LUNG: Clear to auscultation bilaterally; No rales, rhonchi, wheezing,   HEART: Regular rate and rhythm; No murmurs,   ABDOMEN: Soft,  RUQ tender, Nondistended; Bowel sounds present  EXTREMITIES:  Peripheral Pulses, No  cyanosis, or edema  SKIN: No rashes or lesions  psychiatry- mood and affect appropriate, Insight and judgement intact     acetaminophen     Tablet .. 650 milliGRAM(s) Oral every 6 hours PRN  aluminum hydroxide/magnesium hydroxide/simethicone Suspension 30 milliLiter(s) Oral every 4 hours PRN  ciprofloxacin   IVPB 400 milliGRAM(s) IV Intermittent every 12 hours  dextrose 5%. 1000 milliLiter(s) IV Continuous <Continuous>  dextrose 5%. 1000 milliLiter(s) IV Continuous <Continuous>  dextrose 50% Injectable 12.5 Gram(s) IV Push once  dextrose 50% Injectable 25 Gram(s) IV Push once  dextrose 50% Injectable 25 Gram(s) IV Push once  dextrose Oral Gel 15 Gram(s) Oral once PRN  famotidine  IVPB 20 milliGRAM(s) IV Intermittent daily  glucagon  Injectable 1 milliGRAM(s) IntraMuscular once  indomethacin Suppository 100 milliGRAM(s) Rectal once  insulin glargine Injectable (LANTUS) 5 Unit(s) SubCutaneous at bedtime  insulin lispro (ADMELOG) corrective regimen sliding scale   SubCutaneous every 6 hours  melatonin 3 milliGRAM(s) Oral at bedtime PRN  morphine  - Injectable 2 milliGRAM(s) IV Push every 4 hours PRN  ondansetron Injectable 4 milliGRAM(s) IV Push every 8 hours PRN  sodium chloride 0.9% lock flush 3 milliLiter(s) IV Push every 8 hours      LABS:                          12.9   6.22  )-----------( 258      ( 10 Aug 2023 09:34 )             38.1     08-10    136  |  100  |  9.8  ----------------------------<  194<H>  3.6   |  25.0  |  0.54    Ca    9.2      10 Aug 2023 09:34    TPro  7.5  /  Alb  3.9  /  TBili  0.4  /  DBili  x   /  AST  33<H>  /  ALT  34<H>  /  AlkPhos  78  08-10    LIVER FUNCTIONS - ( 10 Aug 2023 09:34 )  Alb: 3.9 g/dL / Pro: 7.5 g/dL / ALK PHOS: 78 U/L / ALT: 34 U/L / AST: 33 U/L / GGT: x           PT/INR - ( 10 Aug 2023 09:34 )   PT: 11.6 sec;   INR: 1.05 ratio         PTT - ( 10 Aug 2023 09:34 )  PTT:29.4 sec      Urinalysis Basic - ( 10 Aug 2023 09:34 )    Color: x / Appearance: x / SG: x / pH: x  Gluc: 194 mg/dL / Ketone: x  / Bili: x / Urobili: x   Blood: x / Protein: x / Nitrite: x   Leuk Esterase: x / RBC: x / WBC x   Sq Epi: x / Non Sq Epi: x / Bacteria: x      CAPILLARY BLOOD GLUCOSE      POCT Blood Glucose.: 172 mg/dL (10 Aug 2023 12:29)  POCT Blood Glucose.: 198 mg/dL (10 Aug 2023 05:34)  POCT Blood Glucose.: 180 mg/dL (10 Aug 2023 05:27)  POCT Blood Glucose.: 170 mg/dL (09 Aug 2023 23:25)  POCT Blood Glucose.: 184 mg/dL (09 Aug 2023 22:02)  POCT Blood Glucose.: 117 mg/dL (09 Aug 2023 16:44)      RADIOLOGY & ADDITIONAL TESTS:      Consultant notes reviewed    Case discussed with consultant/provider/ family /patient  RY STRONG Patient is a 63y old  Female who presents with a chief complaint of Choledocholithiasis (10 Aug 2023 08:22)     HPI:  64 y/o female with hx of DM-2, HTN, HLD, GERD who went to [facility] [place] earlier today c/o RUQ pain, Nausea, followed by NBNB vomits. Describes symptoms as sudden onset with no recent illnesses, travel, or sick contacts. She denies any changes in her medications. At [facility] [place] noted to have stable vitals, RUQ pain on exam, elevated AP with mild elevation of ALT, normal AST and normal bili. RUQ sono with mild CBD dilation, MRCP w/ localized left hepatic intraductal dilation concerning for neoplasm, stone, or stricture. Case was discussed with GI on call who agreed to have Patient transferred here for ERCP. At this time denies N/V, pain, SOB, F/C. Vitals stable. Seen with .  (07 Aug 2023 01:15)    The patient was seen and evaluated stones - needs ERCP today  The patient is in no acute distress.  Denied any fever chest pain, palpitations, shortness of breath, dysuria, cough, edema       I&O's Summary    Allergies    No Known Allergies    Intolerances      HEALTH ISSUES - PROBLEM Dx:  Abnormal finding on radiology exam          PAST MEDICAL & SURGICAL HISTORY:  HTN (hypertension)      HLD (hyperlipidemia)      Diabetes      GERD (gastroesophageal reflux disease)      No significant past surgical history              Vital Signs Last 24 Hrs  T(C): 37 (10 Aug 2023 09:15), Max: 37 (10 Aug 2023 04:15)  T(F): 98.6 (10 Aug 2023 09:15), Max: 98.6 (10 Aug 2023 04:15)  HR: 64 (10 Aug 2023 09:15) (63 - 99)  BP: 117/74 (10 Aug 2023 09:15) (101/63 - 144/80)  BP(mean): --  RR: 18 (10 Aug 2023 09:15) (18 - 18)  SpO2: 95% (10 Aug 2023 09:15) (93% - 96%)    Parameters below as of 10 Aug 2023 09:15  Patient On (Oxygen Delivery Method): room air    T(C): 37 (08-10-23 @ 09:15), Max: 37 (08-10-23 @ 04:15)  HR: 64 (08-10-23 @ 09:15) (63 - 99)  BP: 117/74 (08-10-23 @ 09:15) (101/63 - 144/80)  RR: 18 (08-10-23 @ 09:15) (18 - 18)  SpO2: 95% (08-10-23 @ 09:15) (93% - 96%)  Wt(kg): --    PHYSICAL EXAM:    GENERAL: NAD, pleasant conversing   HEAD:  Atraumatic, Normocephalic  EYES: EOMI, PERRL, conjunctiva and sclera clear  ENMT:  Moist mucous membranes,  No lesions  NECK: Supple, No JVD, Normal thyroid  NERVOUS SYSTEM:  Alert & Oriented X3,  Moves upper and lower extremities; CNS-II-XII  CHEST/LUNG: Clear to auscultation bilaterally; No rales, rhonchi, wheezing,   HEART: Regular rate and rhythm; No murmurs,   ABDOMEN: Soft,  RUQ tender, Nondistended; Bowel sounds present  EXTREMITIES:  Peripheral Pulses, No  cyanosis, or edema  SKIN: No rashes or lesions  psychiatry- mood and affect appropriate, Insight and judgement intact     acetaminophen     Tablet .. 650 milliGRAM(s) Oral every 6 hours PRN  aluminum hydroxide/magnesium hydroxide/simethicone Suspension 30 milliLiter(s) Oral every 4 hours PRN  ciprofloxacin   IVPB 400 milliGRAM(s) IV Intermittent every 12 hours  dextrose 5%. 1000 milliLiter(s) IV Continuous <Continuous>  dextrose 5%. 1000 milliLiter(s) IV Continuous <Continuous>  dextrose 50% Injectable 12.5 Gram(s) IV Push once  dextrose 50% Injectable 25 Gram(s) IV Push once  dextrose 50% Injectable 25 Gram(s) IV Push once  dextrose Oral Gel 15 Gram(s) Oral once PRN  famotidine  IVPB 20 milliGRAM(s) IV Intermittent daily  glucagon  Injectable 1 milliGRAM(s) IntraMuscular once  indomethacin Suppository 100 milliGRAM(s) Rectal once  insulin glargine Injectable (LANTUS) 5 Unit(s) SubCutaneous at bedtime  insulin lispro (ADMELOG) corrective regimen sliding scale   SubCutaneous every 6 hours  melatonin 3 milliGRAM(s) Oral at bedtime PRN  morphine  - Injectable 2 milliGRAM(s) IV Push every 4 hours PRN  ondansetron Injectable 4 milliGRAM(s) IV Push every 8 hours PRN  sodium chloride 0.9% lock flush 3 milliLiter(s) IV Push every 8 hours      LABS:                          12.9   6.22  )-----------( 258      ( 10 Aug 2023 09:34 )             38.1     08-10    136  |  100  |  9.8  ----------------------------<  194<H>  3.6   |  25.0  |  0.54    Ca    9.2      10 Aug 2023 09:34    TPro  7.5  /  Alb  3.9  /  TBili  0.4  /  DBili  x   /  AST  33<H>  /  ALT  34<H>  /  AlkPhos  78  08-10    LIVER FUNCTIONS - ( 10 Aug 2023 09:34 )  Alb: 3.9 g/dL / Pro: 7.5 g/dL / ALK PHOS: 78 U/L / ALT: 34 U/L / AST: 33 U/L / GGT: x           PT/INR - ( 10 Aug 2023 09:34 )   PT: 11.6 sec;   INR: 1.05 ratio         PTT - ( 10 Aug 2023 09:34 )  PTT:29.4 sec      Urinalysis Basic - ( 10 Aug 2023 09:34 )    Color: x / Appearance: x / SG: x / pH: x  Gluc: 194 mg/dL / Ketone: x  / Bili: x / Urobili: x   Blood: x / Protein: x / Nitrite: x   Leuk Esterase: x / RBC: x / WBC x   Sq Epi: x / Non Sq Epi: x / Bacteria: x      CAPILLARY BLOOD GLUCOSE      POCT Blood Glucose.: 172 mg/dL (10 Aug 2023 12:29)  POCT Blood Glucose.: 198 mg/dL (10 Aug 2023 05:34)  POCT Blood Glucose.: 180 mg/dL (10 Aug 2023 05:27)  POCT Blood Glucose.: 170 mg/dL (09 Aug 2023 23:25)  POCT Blood Glucose.: 184 mg/dL (09 Aug 2023 22:02)  POCT Blood Glucose.: 117 mg/dL (09 Aug 2023 16:44)      RADIOLOGY & ADDITIONAL TESTS:      Consultant notes reviewed    Case discussed with consultant/provider/ family /patient

## 2023-08-10 NOTE — PROGRESS NOTE ADULT - ASSESSMENT
62 y/o female with left hepatic lobe intraductal dilation, hx of DM-2, HTN, HLD, GERD    1-Left hepatic lobe intra-ductal dilation:  repeat MR of abd w/wo contrast P - showed multiple stones  PLAN for ERCP today  LFTs are normal       2-DM-2:  off oral meds   monitor BG , NPO   -SS coverage, basal insulin as needed     3-HTN:  BP was soft on admission   stable       home discharge planing pending above 64 y/o female with left hepatic lobe intraductal dilation, hx of DM-2, HTN, HLD, GERD    1-Left hepatic lobe intra-ductal dilation:  repeat MR of abd w/wo contrast P - showed multiple stones  PLAN for ERCP today  LFTs are normal       2-DM-2:  off oral meds   monitor BG , NPO   -SS coverage, basal insulin as needed     3-HTN:  BP was soft on admission   stable       home discharge planing pending above

## 2023-08-10 NOTE — CHART NOTE - NSCHARTNOTEFT_GEN_A_CORE
GI Note: MRCP reviewed. Multiple stones seen in left intrahepatic ductal system. Pt for ERCP today. The risks vs, benefits of the procedure were explained to the pt. today in Ugandan by myself who speaks Ugandan. She appeared to understand all of the above and was agreeable to proceeding with ERCP later today. Keep NPO for now pending ERCP. Repot to follow. GI Note: MRCP reviewed. Multiple stones seen in left intrahepatic ductal system. Pt for ERCP today. The risks vs, benefits of the procedure were explained to the pt. today in Fijian by myself who speaks Fijian. She appeared to understand all of the above and was agreeable to proceeding with ERCP later today. Keep NPO for now pending ERCP. Repot to follow. GI Note: MRCP reviewed. Multiple stones seen in left intrahepatic ductal system. Pt for ERCP today. The risks vs, benefits of the procedure were explained to the pt. today in [place]orese by myself who speaks [place]se. She appeared to understand all of the above and was agreeable to proceeding with ERCP later today. Keep NPO for now pending ERCP. Repot to follow.

## 2023-08-11 ENCOUNTER — TRANSCRIPTION ENCOUNTER (OUTPATIENT)
Age: 63
End: 2023-08-11

## 2023-08-11 VITALS
HEART RATE: 78 BPM | OXYGEN SATURATION: 97 % | TEMPERATURE: 99 F | RESPIRATION RATE: 18 BRPM | SYSTOLIC BLOOD PRESSURE: 118 MMHG | DIASTOLIC BLOOD PRESSURE: 78 MMHG

## 2023-08-11 LAB
GLUCOSE BLDC GLUCOMTR-MCNC: 182 MG/DL — HIGH (ref 70–99)
GLUCOSE BLDC GLUCOMTR-MCNC: 253 MG/DL — HIGH (ref 70–99)

## 2023-08-11 PROCEDURE — 99233 SBSQ HOSP IP/OBS HIGH 50: CPT

## 2023-08-11 PROCEDURE — 99231 SBSQ HOSP IP/OBS SF/LOW 25: CPT

## 2023-08-11 RX ORDER — INSULIN GLARGINE 100 [IU]/ML
5 INJECTION, SOLUTION SUBCUTANEOUS
Qty: 0 | Refills: 0 | DISCHARGE
Start: 2023-08-11

## 2023-08-11 RX ORDER — LANOLIN ALCOHOL/MO/W.PET/CERES
1 CREAM (GRAM) TOPICAL
Qty: 0 | Refills: 0 | DISCHARGE
Start: 2023-08-11

## 2023-08-11 RX ORDER — INSULIN LISPRO 100/ML
VIAL (ML) SUBCUTANEOUS
Refills: 0 | Status: DISCONTINUED | OUTPATIENT
Start: 2023-08-11 | End: 2023-08-11

## 2023-08-11 RX ADMIN — Medication 200 MILLIGRAM(S): at 05:37

## 2023-08-11 RX ADMIN — Medication 650 MILLIGRAM(S): at 12:30

## 2023-08-11 RX ADMIN — FAMOTIDINE 100 MILLIGRAM(S): 10 INJECTION INTRAVENOUS at 11:06

## 2023-08-11 RX ADMIN — SODIUM CHLORIDE 3 MILLILITER(S): 9 INJECTION INTRAMUSCULAR; INTRAVENOUS; SUBCUTANEOUS at 12:31

## 2023-08-11 RX ADMIN — Medication 650 MILLIGRAM(S): at 11:05

## 2023-08-11 RX ADMIN — SODIUM CHLORIDE 3 MILLILITER(S): 9 INJECTION INTRAMUSCULAR; INTRAVENOUS; SUBCUTANEOUS at 05:49

## 2023-08-11 RX ADMIN — Medication 2: at 08:35

## 2023-08-11 RX ADMIN — Medication 650 MILLIGRAM(S): at 06:50

## 2023-08-11 NOTE — PROGRESS NOTE ADULT - PROBLEM SELECTOR PLAN 1
RUQ sono with mild CBD dilation, measuring 8 mm, MRCP w/ localized left hepatic intraductal dilation concerning for neoplasm, stone, or stricture. Gallbladder was normal.    Her liver enzymes are normal, abdominal pain, nausea,  vomiting, diarrhea now resolved. Tolerating diet.   No indication for  EUS/ ERCP at this time.   Repeat MRCP for further evaluation, pending  Protonix for GI mucosal protection    Trend LFTs, avoid hepatotoxic agents.  Consider surgical oncology consult  Further plan pending MRCP with IV contrast report
RUQ sono with mild CBD dilation, measuring 8 mm, MRCP w/ localized left hepatic intraductal dilation concerning for neoplasm, stone, or stricture. Gallbladder was normal.    Repeat MRCP on 08/09: Mild to moderate intrahepatic biliary dilatation involving segment 2 adjacent to the falciform ligament secondary to multiple pigmented stones. No evidence of biliary mass or abnormal biliary   thickening and enhancement.  S/p ERCP sphincterotomy, biliary stent placement  yesterday, unable to pass the stricture.    Surgery was consulted, plan for surgery as outpatient  Protonix for GI mucosal protection
RUQ sono with mild CBD dilation, measuring 8 mm, MRCP w/ localized left hepatic intraductal dilation concerning for neoplasm, stone, or stricture. Gallbladder was normal.    Her liver enzymes normal, abdominal pain, nausea,  vomiting, diarrhea now resolved. Tolerating diet.   No indication for  EUS/ ERCP at this time. Repeat MRCP for further evaluation, pending  Protonix for GI mucosal protection    Trend LFTs, avoid hepatotoxic agents.  Further plan pending MRCP with IV contrast report

## 2023-08-11 NOTE — PROGRESS NOTE ADULT - PROVIDER SPECIALTY LIST ADULT
Hospitalist
Surgery
Surgery
Hospitalist
Gastroenterology
Hospitalist
Gastroenterology
Gastroenterology

## 2023-08-11 NOTE — DISCHARGE NOTE PROVIDER - HOSPITAL COURSE
Patient seen and evaluated at bedside, reporting no complaints. Denies abdominal pain nausea, vomiting, diarrhea, fever, chills. S/p ERCP, sphincterotomy, biliary stent placement  yesterday, unable to pass the stricture.      Constitutional: NAD   Neuro: Awake alert, oriented  HEENT: anicteric sclerae  Neck: supple,   CV: regular rate, regular rhythm  Pulm/chest: lung sounds clear no ronchi wheezes and equal bilaterally, no accessory muscle use noted  Abd: soft, nontender, nondistended +bowel sounds. No rigidity, rebound tenderness, or guarding  Ext: no Cyanosis, clubbing or edema  Skin: warm, no jaundice   Psych: pleasant talking conversing     RUQ sono with mild CBD dilation, measuring 8 mm, MRCP w/ localized left hepatic intraductal dilation concerning for neoplasm, stone, or stricture. Gallbladder was normal.    Repeat MRCP on 08/09: Mild to moderate intrahepatic biliary dilatation involving segment 2 adjacent to the falciform ligament secondary to multiple pigmented stones. No evidence of biliary mass or abnormal biliary   thickening and enhancement.  S/p ERCP sphincterotomy, biliary stent placement  yesterday, unable to pass the stricture.    Surgery was consulted, plan for surgery as outpatient- DR Ansari aware  Protonix for GI mucosal protection.  will hold Statin, Janumet and continue Lantus and Glimepiride till abdominal issues resolved    Patient seen and evaluated at bedside, reporting no complaints. Denies abdominal pain nausea, vomiting, diarrhea, fever, chills. S/p ERCP, sphincterotomy, biliary stent placement  yesterday, unable to pass the stricture.      Constitutional: NAD   Neuro: Awake alert, oriented  HEENT: anicteric sclerae  Neck: supple,   CV: regular rate, regular rhythm  Pulm/chest: lung sounds clear no ronchi wheezes and equal bilaterally, no accessory muscle use noted  Abd: soft, nontender, nondistended +bowel sounds. No rigidity, rebound tenderness, or guarding  Ext: no Cyanosis, clubbing or edema  Skin: warm, no jaundice   Psych: pleasant talking conversing     RUQ sono with mild CBD dilation, measuring 8 mm, MRCP w/ localized left hepatic intraductal dilation concerning for neoplasm, stone, or stricture. Gallbladder was normal.    Repeat MRCP on 08/09: Mild to moderate intrahepatic biliary dilatation involving segment 2 adjacent to the falciform ligament secondary to multiple pigmented stones. No evidence of biliary mass or abnormal biliary   thickening and enhancement.  S/p ERCP sphincterotomy, biliary stent placement  yesterday, unable to pass the stricture.    Surgery was consulted, plan for surgery as outpatient- DR Ansari aware  Protonix for GI mucosal protection.  will hold Statin, Janumet and continue Lantus and Glimepiride till abdominal issues resolved   discussed above with granddaughter on phone and with patient with

## 2023-08-11 NOTE — PROGRESS NOTE ADULT - ASSESSMENT
64 y/o female with hx of DM-2, HTN, HLD, GERD presented to ED after transferred from [facility] [place] after radiology finding of localized left hepatic intraductal dilation on MRCP. RUQ sono with mild CBD dilation, measuring 8 mm, MRCP w/ localized left hepatic intraductal dilation concerning for neoplasm, stone, or stricture.      62 y/o female with hx of DM-2, HTN, HLD, GERD presented to ED after transferred from [facility] after radiology finding of localized left hepatic intraductal dilation on MRCP. RUQ sono with mild CBD dilation, measuring 8 mm, MRCP w/ localized left hepatic intraductal dilation concerning for neoplasm, stone, or stricture.      64 y/o female with hx of DM-2, HTN, HLD, GERD presented to ED after [place] [facility] [place] radiology finding of localized left hepatic intraductal dilation on MRCP. RUQ sono with mild CBD dilation, measuring 8 mm, MRCP w/ localized left hepatic intraductal dilation concerning for neoplasm, stone, or stricture.

## 2023-08-11 NOTE — PROGRESS NOTE ADULT - SUBJECTIVE AND OBJECTIVE BOX
Chief Complaint: This is a 63y old woman patient being seen in follow-up consultation for choledocholithiasis     Interval HPI / 24H events:  Patient seen and evaluated at bedside, reporting no complaints. Denies abdominal pain nausea, vomiting, diarrhea, fever, chills. S/p ERCP, sphincterotomy, biliary stent placement  yesterday, unable to pass the stricture.      Review of Systems:  . Constitutional: No fever, chills  . HEENT: Negative  · Respiratory and Thorax: No shortness of breath, no cough  · Cardiovascular: No chest pain, palpitation, no dizziness  · Gastrointestinal: see above  · Genitourinary: No hematuria  · Musculoskeletal: Negative  · Neurological: negative  · Psychiatric: no agitation, no anxiety      PAST MEDICAL/SURGICAL HISTORY:  HTN (hypertension)    HLD (hyperlipidemia)    Diabetes    GERD (gastroesophageal reflux disease)    No significant past surgical history      MEDICATIONS  (STANDING):  ciprofloxacin   IVPB 400 milliGRAM(s) IV Intermittent every 12 hours  dextrose 5%. 1000 milliLiter(s) (100 mL/Hr) IV Continuous <Continuous>  dextrose 5%. 1000 milliLiter(s) (50 mL/Hr) IV Continuous <Continuous>  dextrose 50% Injectable 25 Gram(s) IV Push once  dextrose 50% Injectable 12.5 Gram(s) IV Push once  dextrose 50% Injectable 25 Gram(s) IV Push once  famotidine  IVPB 20 milliGRAM(s) IV Intermittent daily  glucagon  Injectable 1 milliGRAM(s) IntraMuscular once  insulin glargine Injectable (LANTUS) 5 Unit(s) SubCutaneous at bedtime  insulin lispro (ADMELOG) corrective regimen sliding scale   SubCutaneous three times a day before meals  sodium chloride 0.9% lock flush 3 milliLiter(s) IV Push every 8 hours    MEDICATIONS  (PRN):  acetaminophen     Tablet .. 650 milliGRAM(s) Oral every 6 hours PRN Temp greater or equal to 38C (100.4F), Mild Pain (1 - 3)  aluminum hydroxide/magnesium hydroxide/simethicone Suspension 30 milliLiter(s) Oral every 4 hours PRN Dyspepsia  dextrose Oral Gel 15 Gram(s) Oral once PRN Blood Glucose LESS THAN 70 milliGRAM(s)/deciliter  melatonin 3 milliGRAM(s) Oral at bedtime PRN Insomnia  morphine  - Injectable 2 milliGRAM(s) IV Push every 4 hours PRN Moderate Pain (4 - 6)  ondansetron Injectable 4 milliGRAM(s) IV Push every 8 hours PRN Nausea and/or Vomiting    No Known Allergies    T(C): 37.4 (08-11-23 @ 04:06), Max: 37.4 (08-11-23 @ 04:06)  HR: 67 (08-11-23 @ 04:06) (60 - 78)  BP: 121/70 (08-11-23 @ 04:06) (103/70 - 133/89)  RR: 18 (08-11-23 @ 04:06) (18 - 18)  SpO2: 92% (08-11-23 @ 04:06) (92% - 95%)        PHYSICAL EXAM:    Constitutional: No acute distress  Neuro: Awake alert, oriented  HEENT: anicteric sclerae  Neck: supple, no JVD  CV: regular rate, regular rhythm  Pulm/chest: lung sounds CTA and equal bilaterally, no accessory muscle use noted  Abd: soft, nontender, nondistended +bowel sounds. No rigidity, rebound tenderness, or guarding  Ext: no Cyanosis, clubbing or edema  Skin: warm, no jaundice   Psych: calm, cooperative      LABS:               12.9   6.22  )-----------( 258      ( 08-10 @ 09:34 )             38.1                12.1   6.70  )-----------( 242      ( 08-09 @ 05:00 )             36.2       08-10    136  |  100  |  9.8  ----------------------------<  194<H>  3.6   |  25.0  |  0.54    Ca    9.2      10 Aug 2023 09:34    TPro  7.5  /  Alb  3.9  /  TBili  0.4  /  DBili  x   /  AST  33<H>  /  ALT  34<H>  /  AlkPhos  78  08-10    LIVER FUNCTIONS - ( 10 Aug 2023 09:34 )  Alb: 3.9 g/dL / Pro: 7.5 g/dL / ALK PHOS: 78 U/L / ALT: 34 U/L / AST: 33 U/L / GGT: x             Lipase: 46 U/L (08-09-23 @ 05:00)    PT/INR - ( 10 Aug 2023 09:34 )   PT: 11.6 sec;   INR: 1.05 ratio         PTT - ( 10 Aug 2023 09:34 )  PTT:29.4 sec      < from: MR MRCP w/wo IV Cont (08.09.23 @ 12:20) >    FINDINGS:  LOWER CHEST: Within normal limits.    LIVER: Within normal limits. Specifically, there is no evidence of   parenchymal atrophy. The hepatic veins and portal vein are patent.  BILE DUCTS: Again is noted mild to moderate intrahepatic dilatation   involving segment 2 of theliver adjacent to the falciform ligament,   extending to the confluence with the medial segment of the left lobe.   There is no evidence of abnormal wall thickening or enhancement of the   dilated intrahepatic biliary tree. There are multiple fillingdefects at   the transition to normal caliber left hepatic duct, measuring up to 6 x 4   mm and demonstrating T1 hyperintensity, compatible with pigmented stones.   The remainder of the intrahepatic biliary tree is unremarkable with no   evidence of stricturing or beading. The common bile duct is not dilated   and measures 5 mm. There is no evidence of filling defect in the distal   common bile duct.  GALLBLADDER: Within normal limits.  SPLEEN: Within normal limits.  PANCREAS: Within normal limits. The pancreatic duct is normal in course   and caliber.  ADRENALS: Within normal limits.  KIDNEYS/URETERS: Within normal limits.    VISUALIZED PORTIONS:  BOWEL: Within normal limits.  PERITONEUM: No ascites.  VESSELS: Within normal limits.  RETROPERITONEUM/LYMPH NODES: No lymphadenopathy.  ABDOMINAL WALL: Within normal limits.  BONES: Within normal limits.    IMPRESSION: Mild to moderate intrahepatic biliary dilatation involving   segment 2 adjacent to the falciform ligament secondary to multiple   pigmented stones. No evidence of biliary mass or abnormal biliary   thickening and enhancement.    < end of copied text >        < from: ERCP (08.10.23 @ 13:52) >      ERCP Findings:         radiograph was taken. Limited views of the esophagus, stomach and duodenum    were unremarkable. The major papilla was noted in the second portion of duodenum    and appeared normal. The major papilla was cannulated, using wire-guided    cannulation, using a hydratome preloaded with a 0.035 inch hydrawire, the common    bile duct was cannulated, the wire was seen in the CBD, cholangiogram was    performed confirming location. Right intrahepatic system was cannulated. Then    sphincterotomy was performed. Then using 9-12 mm balloon tip catheter, balloon    sweeps were performed. Gallbladder was filled. Occluding cholangiogramrevealed    proximal left intrahepatic stricture with proximal dilation. Then we tried    advancing guidewire into the left system but then had to change it to angle tip    viziglide guidewire. Then further attempts to traverse the stricture were    unsuccessful. Then we placed a 7 Fr 5 cm double pig tail left intrahepatic    plastic stent. The pancreatic duct was not injected in this exam. Diluted    epinephrine was sprayed over the sphincterotomy site. The scope was withdrawn    and procedure terminated. Post-procedure xray did not show air under the    diaphragm        Impressions:        Bile duct stricture.        Intrahepatic bile duct stones.        Summary:        Biliary sphincterotomy        Biliary stent placement        Plan:        Await pathology results.        Suurgical oncology evaluation        Patrick Powell MD    < end of copied text >

## 2023-08-11 NOTE — PROGRESS NOTE ADULT - NS ATTEND AMEND GEN_ALL_CORE FT
Patient seen and examined.  Patient with continues with normal LFT and no further abdominal discomfort.  Discussed with attending hospitalist regarding need for MRI and MRCP to plan any further intervention.  MRI department was contacted by the hospitalist team.  Advance diet.  Will follow-up based on the MRI results.
I evaluated and examined this pt. with my ACP and formulated the above assessment and management plan with her. [facility]. Pt's left intrahepatic ductal system stented. Stable for discharge from a GI standpoint.
Patient seen and examined at bedside. Her abdominal pain has resolved, MRCP pending. Continue diet as tolerated

## 2023-08-11 NOTE — DISCHARGE NOTE PROVIDER - NSDCQMPCI_CARD_ALL_CORE
What Type Of Note Output Would You Prefer (Optional)?: Standard Output Hpi Title: Evaluation of Skin Lesions Family Member: Sister No

## 2023-08-11 NOTE — PROGRESS NOTE ADULT - PROBLEM SELECTOR PROBLEM 1
Abnormal finding on radiology exam

## 2023-08-11 NOTE — DISCHARGE NOTE PROVIDER - CARE PROVIDER_API CALL
Rolf Ansari  Surgery  [address] [place] [id number]  Phone: [phone]  Fax: [phone]  Follow Up Time:     Patrick Powell  [address] [place] [address]  Phone: [phone]  Fax: ()-  Follow Up Time:    Rolf Ansari  Surgery  [address] [place] [id number]  Phone: [phone]  Fax: [phone]  Follow Up Time:     Patrick Powell  [address] [place] [address]  Phone: [phone]  Fax: ()-  Follow Up Time:    Rolf Ansari  Surgery  [address] [place] [id number]  Phone: [phone]  Fax: [phone]  Follow Up Time:     Patrick Powell  [address] [place] [address]  Phone: [phone]  Fax: ()-  Follow Up Time:

## 2023-08-11 NOTE — DISCHARGE NOTE NURSING/CASE MANAGEMENT/SOCIAL WORK - NSDCPEFALRISK_GEN_ALL_CORE
For information on Fall & Injury Prevention, visit: [email or web address]/[email or web address]-prevention-protects-and-maintains-health-and-mobility OR  https://www.[facility]/news/fall-prevention-tips-to-avoid-injury OR  https://www.cdc.gov/steadi/patient.html For information on Fall & Injury Prevention, visit: [email or web address]://www.[facility]/news/fall-prevention-protects-and-maintains-health-and-mobility OR  https://www.[facility]/news/fall-prevention-tips-to-avoid-injury OR  https://www.cdc.gov/steadi/patient.html For information on Fall & Injury Prevention, visit: https://www.[facility]/news/fall-prevention-protects-and-maintains-health-and-mobility OR  https://www.[facility]/news/fall-prevention-tips-to-avoid-injury OR  https://www.cdc.gov/steadi/patient.html

## 2023-08-11 NOTE — DISCHARGE NOTE NURSING/CASE MANAGEMENT/SOCIAL WORK - PATIENT PORTAL LINK FT
You can access the FollowMyHealth Patient Portal offered by [facility] by registering at the following website: [email or web address]. By joining [email or web address]’s FollowMyHealth portal, you will also be able to view your health information using other applications (apps) compatible with our system. You can access the FollowMyHealth Patient Portal offered by  by registering at the following website: [email or web address]. By joining [employer]’s FollowMyHealth portal, you will also be able to view your health information using other applications (apps) compatible with our system. You can access the FollowMyHealth Patient Portal offered by [facility] by registering at the following website: [email or web address]. By joining [employer]’s FollowMyHealth portal, you will also be able to view your health information using other applications (apps) compatible with our system.

## 2023-08-11 NOTE — DISCHARGE NOTE PROVIDER - CARE PROVIDERS DIRECT ADDRESSES
,[email or web address],[email or web address] ,[email or web address],DirectAddress_Unknown ,[email or web address],DirectAddress_Unknown

## 2023-08-11 NOTE — DISCHARGE NOTE PROVIDER - PROVIDER TOKENS
PROVIDER:[TOKEN:[[id number]]],PROVIDER:[TOKEN:[[id number]]] PROVIDER:[TOKEN:[[id number]]],PROVIDER:[TOKEN:[[id number]]] PROVIDER:[TOKEN:[[id number]]],PROVIDER:[TOKEN:[[id number]]]

## 2023-08-11 NOTE — DISCHARGE NOTE PROVIDER - NSDCMRMEDTOKEN_GEN_ALL_CORE_FT
glimepiride 4 mg oral tablet: 1 orally once a day  insulin glargine 100 units/mL subcutaneous solution: 5 unit(s) subcutaneous once a day (at bedtime)  lisinopril 20 mg oral tablet: 1 orally once a day  melatonin 3 mg oral tablet: 1 tab(s) orally once a day (at bedtime) As needed Insomnia  Protonix 40 mg oral delayed release tablet: 1 orally once a day

## 2023-08-16 PROBLEM — Z00.00 ENCOUNTER FOR PREVENTIVE HEALTH EXAMINATION: Status: ACTIVE | Noted: 2023-08-16

## 2023-08-16 PROBLEM — K83.8: Status: ACTIVE | Noted: 2023-08-16

## 2023-08-16 NOTE — ASSESSMENT
[FreeTextEntry1] : Rosetta Landeros is a 62 yo F who presents today for an initial consultation referred by Dr. Powell for a biliary mass. She presents at [facility] on 08/06/23 for abdominal pain, vomiting and diarrhea. A MRCP was performed and transferred and admitted to [facility] for Choledocholithiasis and had an ERCP. performed.   ***US ABD*** 08/06/23 - FINDINGS: Liver: Diffuse fatty infiltration. Bile ducts: Mild intrahepatic biliary ductal dilatation. Common bile duct measures 8 mm. Pancreas: Visualized portions are within normal limits.  Right kidney: 10.4 cm. No hydronephrosis. IMPRESSION: Hepatic steatosis. Mild intrahepatic biliary ductal dilatation and mild common bile duct dilatation without identifiable cause. Further evaluation with contrast-enhanced CT scan of the abdomen recommended.    ***MR MRCP*** 08/06/23 FINDINGS: LIVER: Steatosis. Hepatomegaly with relative enlargement of the right hepatic lobe/Riedel's lobe. BILE DUCTS: Localized mild to moderate intrahepatic ductal dilatation at the junctions of segments 2 and 4. Difficult to localize 14 mm T1 hyperintense nodule at the site of biliary obstruction, possibly an intraductal stone or proteinaceous material within a dilated ectatic duct versus parenchymal or vascular in location. Mild extrahepatic dilatation without definite cause identified, upper common bile duct measuring 8 mm. Tapered appearance of the distal duct above the ampulla. ABDOMINAL WALL: To fat-containing umbilical hernia. IMPRESSION: 1.  Localized left hepatic lobe intraductal dilatation. Difficult to localize T1 hyperintense nodule at the site of biliary obstruction, possibly representing an intraductal stone/proteinaceous material (such as in the setting of biliary stricture, recurrent pyogenic cholangitis, or Caroli's disease) versus portal venous thrombus or adjacent parenchymal lesion. 2.  Nonspecific extrahepatic ductal dilatation without specific cause identified. Consider ampullary stenosis, occult ampullary lesion, or stricture. 3.  Hepatic steatosis and hepatomegaly.  ***MR MRCP*** 08/09/23 - FINDINGS: BILE DUCTS: Again, is noted mild to moderate intrahepatic dilatation involving segment 2 of the liver adjacent to the falciform ligament, extending to the confluence with the medial segment of the left lobe. There is no evidence of abnormal wall thickening or enhancement of the dilated intrahepatic biliary tree. There are multiple filling defects at the transition to normal caliber left hepatic duct, measuring up to 6 x 4 mm and demonstrating T1 hyperintensity, compatible with pigmented stones. The remainder of the intrahepatic biliary tree is unremarkable with no evidence of stricturing or beading. The common bile duct is not dilated and measures 5 mm. There is no evidence of filling defect in the distal common bile duct. IMPRESSION: Mild to moderate intrahepatic biliary dilatation involving segment 2 adjacent to the falciform ligament secondary to multiple pigmented stones. No evidence of biliary mass or abnormal biliary thickening and enhancement.  ERCP 08/10/23- Dr. SERG Powell Impressions: Bile Duct Stricture, Intrahepatic bile duct stones Summary: Biliary Sphincterotomy, and stent placement   Plan; 1) CEA, CA-19-9 2) CMP 3) CT CAP

## 2023-08-16 NOTE — HISTORY OF PRESENT ILLNESS
[de-identified] : Rosetta Landeros is a 64 yo F who presents today for an initial consultation referred by Dr. Powell for a biliary mass. She presents at [facility] on 08/06/23 for abdominal pain, vomiting and diarrhea. A MRCP was performed and transferred and admitted to [facility] for Choledocholithiasis and had an ERCP. performed.   ***US ABD*** 08/06/23 - FINDINGS: Liver: Diffuse fatty infiltration. Bile ducts: Mild intrahepatic biliary ductal dilatation. Common bile duct measures 8 mm. Pancreas: Visualized portions are within normal limits.  Right kidney: 10.4 cm. No hydronephrosis. IMPRESSION: Hepatic steatosis. Mild intrahepatic biliary ductal dilatation and mild common bile duct dilatation without identifiable cause. Further evaluation with contrast-enhanced CT scan of the abdomen recommended.    ***MR MRCP*** 08/06/23 FINDINGS: LIVER: Steatosis. Hepatomegaly with relative enlargement of the right hepatic lobe/Riedel's lobe. BILE DUCTS: Localized mild to moderate intrahepatic ductal dilatation at the junctions of segments 2 and 4. Difficult to localize 14 mm T1 hyperintense nodule at the site of biliary obstruction, possibly an intraductal stone or proteinaceous material within a dilated ectatic duct versus parenchymal or vascular in location. Mild extrahepatic dilatation without definite cause identified, upper common bile duct measuring 8 mm. Tapered appearance of the distal duct above the ampulla. ABDOMINAL WALL: To fat-containing umbilical hernia. IMPRESSION: 1.  Localized left hepatic lobe intraductal dilatation. Difficult to localize T1 hyperintense nodule at the site of biliary obstruction, possibly representing an intraductal stone/proteinaceous material (such as in the setting of biliary stricture, recurrent pyogenic cholangitis, or Caroli's disease) versus portal venous thrombus or adjacent parenchymal lesion. 2.  Nonspecific extrahepatic ductal dilatation without specific cause identified. Consider ampullary stenosis, occult ampullary lesion, or stricture. 3.  Hepatic steatosis and hepatomegaly.  ***MR MRCP*** 08/09/23 - FINDINGS: BILE DUCTS: Again, is noted mild to moderate intrahepatic dilatation involving segment 2 of the liver adjacent to the falciform ligament, extending to the confluence with the medial segment of the left lobe. There is no evidence of abnormal wall thickening or enhancement of the dilated intrahepatic biliary tree. There are multiple filling defects at the transition to normal caliber left hepatic duct, measuring up to 6 x 4 mm and demonstrating T1 hyperintensity, compatible with pigmented stones. The remainder of the intrahepatic biliary tree is unremarkable with no evidence of stricturing or beading. The common bile duct is not dilated and measures 5 mm. There is no evidence of filling defect in the distal common bile duct. IMPRESSION: Mild to moderate intrahepatic biliary dilatation involving segment 2 adjacent to the falciform ligament secondary to multiple pigmented stones. No evidence of biliary mass or abnormal biliary thickening and enhancement.  ERCP 08/10/23- Dr. SERG Powell Impressions: Bile Duct Stricture, Intrahepatic bile duct stones Summary: Biliary Sphincterotomy, and stent placement   PMHx: PSHx: PFHx: SHx:

## 2023-08-17 ENCOUNTER — APPOINTMENT (OUTPATIENT)
Dept: SURGICAL ONCOLOGY | Facility: CLINIC | Age: 63
End: 2023-08-17

## 2023-08-17 DIAGNOSIS — K83.8 OTHER SPECIFIED DISEASES OF BILIARY TRACT: ICD-10-CM

## 2023-08-22 LAB
SURGICAL PATHOLOGY STUDY: SIGNIFICANT CHANGE UP

## 2023-10-08 PROCEDURE — 85025 COMPLETE CBC W/AUTO DIFF WBC: CPT

## 2023-10-08 PROCEDURE — 83735 ASSAY OF MAGNESIUM: CPT

## 2023-10-08 PROCEDURE — 88341 IMHCHEM/IMCYTCHM EA ADD ANTB: CPT

## 2023-10-08 PROCEDURE — 80076 HEPATIC FUNCTION PANEL: CPT

## 2023-10-08 PROCEDURE — 86850 RBC ANTIBODY SCREEN: CPT

## 2023-10-08 PROCEDURE — 88342 IMHCHEM/IMCYTCHM 1ST ANTB: CPT

## 2023-10-08 PROCEDURE — 86803 HEPATITIS C AB TEST: CPT

## 2023-10-08 PROCEDURE — C2625: CPT

## 2023-10-08 PROCEDURE — 80053 COMPREHEN METABOLIC PANEL: CPT

## 2023-10-08 PROCEDURE — 88364 INSITU HYBRIDIZATION (FISH): CPT

## 2023-10-08 PROCEDURE — 80048 BASIC METABOLIC PNL TOTAL CA: CPT

## 2023-10-08 PROCEDURE — 74183 MRI ABD W/O CNTR FLWD CNTR: CPT

## 2023-10-08 PROCEDURE — C1769: CPT

## 2023-10-08 PROCEDURE — 36415 COLL VENOUS BLD VENIPUNCTURE: CPT

## 2023-10-08 PROCEDURE — 86901 BLOOD TYPING SEROLOGIC RH(D): CPT

## 2023-10-08 PROCEDURE — 83036 HEMOGLOBIN GLYCOSYLATED A1C: CPT

## 2023-10-08 PROCEDURE — 86301 IMMUNOASSAY TUMOR CA 19-9: CPT

## 2023-10-08 PROCEDURE — C1773: CPT

## 2023-10-08 PROCEDURE — 84100 ASSAY OF PHOSPHORUS: CPT

## 2023-10-08 PROCEDURE — 86900 BLOOD TYPING SEROLOGIC ABO: CPT

## 2023-10-08 PROCEDURE — 93005 ELECTROCARDIOGRAM TRACING: CPT

## 2023-10-08 PROCEDURE — 83690 ASSAY OF LIPASE: CPT

## 2023-10-08 PROCEDURE — 88365 INSITU HYBRIDIZATION (FISH): CPT

## 2023-10-08 PROCEDURE — 82962 GLUCOSE BLOOD TEST: CPT

## 2023-10-08 PROCEDURE — 88305 TISSUE EXAM BY PATHOLOGIST: CPT

## 2023-10-08 PROCEDURE — 85610 PROTHROMBIN TIME: CPT

## 2023-10-08 PROCEDURE — 88360 TUMOR IMMUNOHISTOCHEM/MANUAL: CPT

## 2023-10-08 PROCEDURE — 82378 CARCINOEMBRYONIC ANTIGEN: CPT

## 2023-10-08 PROCEDURE — T1013: CPT

## 2023-10-08 PROCEDURE — C2617: CPT

## 2023-10-08 PROCEDURE — 71045 X-RAY EXAM CHEST 1 VIEW: CPT

## 2023-10-08 PROCEDURE — 74329 X-RAY FOR PANCREAS ENDOSCOPY: CPT

## 2023-10-08 PROCEDURE — 85730 THROMBOPLASTIN TIME PARTIAL: CPT

## 2023-10-08 PROCEDURE — 85027 COMPLETE CBC AUTOMATED: CPT

## 2023-12-24 RX ORDER — LISINOPRIL 2.5 MG/1
1 TABLET ORAL
Refills: 0 | DISCHARGE

## 2023-12-24 RX ORDER — ATORVASTATIN CALCIUM 80 MG/1
1 TABLET, FILM COATED ORAL
Refills: 0 | DISCHARGE

## 2023-12-24 RX ORDER — SITAGLIPTIN AND METFORMIN HYDROCHLORIDE 500; 50 MG/1; MG/1
1 TABLET, FILM COATED ORAL
Refills: 0 | DISCHARGE

## 2023-12-24 RX ORDER — PANTOPRAZOLE SODIUM 20 MG/1
1 TABLET, DELAYED RELEASE ORAL
Refills: 0 | DISCHARGE

## 2023-12-24 RX ORDER — GLIMEPIRIDE 1 MG
1 TABLET ORAL
Refills: 0 | DISCHARGE

## 2025-02-22 NOTE — ED ADULT TRIAGE NOTE - NS ED NURSE AMBULANCES
[facility] hard copy, drawn during this pregnancy

## (undated) DEVICE — SYR IV FLUSH SALINE 10ML 30/TY

## (undated) DEVICE — SOL BAG NS 0.9% 1000ML

## (undated) DEVICE — CATH IV SAFE BC 22G X 1" (BLUE)

## (undated) DEVICE — TUBING IV EXTENSION MACRO W CLAVE 7"

## (undated) DEVICE — SOL IRR BAG H2O 1000ML

## (undated) DEVICE — DRSG CURITY GAUZE SPONGE 4 X 4" 12-PLY NON-STERILE

## (undated) DEVICE — SYR SLIP 10CC

## (undated) DEVICE — DRSG 2X2

## (undated) DEVICE — SSH-ERBE RM1 11351341: Type: DURABLE MEDICAL EQUIPMENT

## (undated) DEVICE — SOL IRR BAG NS 0.9% 1000ML

## (undated) DEVICE — SENSOR O2 FINGER ADULT

## (undated) DEVICE — GOWN IMPERV XL

## (undated) DEVICE — PACK IV START WITH CHG

## (undated) DEVICE — FORCEP RADIAL JAW 4 W NDL 2.4MM 2.8MM 240CM ORANGE DISP

## (undated) DEVICE — TUBING ALARIS PUMP MODULE NON-DEHP

## (undated) DEVICE — SYR LUER SLIP TIP 50CC

## (undated) DEVICE — DENTURE CUP PINK

## (undated) DEVICE — VENODYNE/SCD SLEEVE CALF MEDIUM

## (undated) DEVICE — WARMING BLANKET FULL ADULT

## (undated) DEVICE — UNDERPAD LINEN SAVER 23 X 36"

## (undated) DEVICE — MASK PROC EAR LOOP